# Patient Record
Sex: MALE | Race: WHITE | Employment: OTHER | URBAN - NONMETROPOLITAN AREA
[De-identification: names, ages, dates, MRNs, and addresses within clinical notes are randomized per-mention and may not be internally consistent; named-entity substitution may affect disease eponyms.]

---

## 2019-04-11 LAB
ALBUMIN SERPL-MCNC: 4.3 G/DL
ALP BLD-CCNC: 77 U/L
ALT SERPL-CCNC: 15 U/L
ANION GAP SERPL CALCULATED.3IONS-SCNC: ABNORMAL MMOL/L
AST SERPL-CCNC: 18 U/L
BASOPHILS ABSOLUTE: 37 /ΜL
BASOPHILS RELATIVE PERCENT: 0.5 %
BILIRUB SERPL-MCNC: 0.5 MG/DL (ref 0.1–1.4)
BUN BLDV-MCNC: 23 MG/DL
CALCIUM SERPL-MCNC: 9.9 MG/DL
CHLORIDE BLD-SCNC: 105 MMOL/L
CHOLESTEROL, TOTAL: 138 MG/DL
CHOLESTEROL/HDL RATIO: 3.5
CO2: 24 MMOL/L
CREAT SERPL-MCNC: 1.32 MG/DL
CREATININE URINE: 108 MG/DL
EOSINOPHILS ABSOLUTE: 148 /ΜL
EOSINOPHILS RELATIVE PERCENT: 2 %
GFR CALCULATED: 58
GLUCOSE BLD-MCNC: 177 MG/DL
HCT VFR BLD CALC: 45.3 % (ref 41–53)
HDLC SERPL-MCNC: 39 MG/DL (ref 35–70)
HEMOGLOBIN: 15.7 G/DL (ref 13.5–17.5)
IRON: 94
LDL CHOLESTEROL CALCULATED: 78 MG/DL (ref 0–160)
LYMPHOCYTES ABSOLUTE: 2575 /ΜL
LYMPHOCYTES RELATIVE PERCENT: 34.8 %
MCH RBC QN AUTO: 31.1 PG
MCHC RBC AUTO-ENTMCNC: 34.7 G/DL
MCV RBC AUTO: 89.7 FL
MICROALBUMIN/CREAT 24H UR: 0.4 MG/G{CREAT}
MONOCYTES ABSOLUTE: 370 /ΜL
MONOCYTES RELATIVE PERCENT: 5 %
NEUTROPHILS ABSOLUTE: NORMAL /ΜL
NEUTROPHILS RELATIVE PERCENT: 57.7 %
PDW BLD-RTO: 12.8 %
PHOSPHORUS: 3.6 MG/DL
PLATELET # BLD: 234 K/ΜL
PMV BLD AUTO: 10.8 FL
POTASSIUM SERPL-SCNC: 5.1 MMOL/L
RBC # BLD: 5.05 10^6/ΜL
SODIUM BLD-SCNC: 138 MMOL/L
TOTAL PROTEIN: 7
TRIGL SERPL-MCNC: 128 MG/DL
URIC ACID: 6.1
VLDLC SERPL CALC-MCNC: 99 MG/DL
WBC # BLD: 7.4 10^3/ML

## 2019-12-26 ENCOUNTER — OFFICE VISIT (OUTPATIENT)
Dept: FAMILY MEDICINE CLINIC | Age: 64
End: 2019-12-26
Payer: COMMERCIAL

## 2019-12-26 VITALS
SYSTOLIC BLOOD PRESSURE: 142 MMHG | OXYGEN SATURATION: 97 % | BODY MASS INDEX: 34.31 KG/M2 | RESPIRATION RATE: 18 BRPM | DIASTOLIC BLOOD PRESSURE: 78 MMHG | HEART RATE: 87 BPM | WEIGHT: 218.6 LBS | HEIGHT: 67 IN

## 2019-12-26 DIAGNOSIS — E78.5 HYPERLIPIDEMIA, UNSPECIFIED HYPERLIPIDEMIA TYPE: ICD-10-CM

## 2019-12-26 DIAGNOSIS — R39.198 DIFFICULTY URINATING: ICD-10-CM

## 2019-12-26 DIAGNOSIS — E11.9 TYPE 2 DIABETES MELLITUS WITHOUT COMPLICATION, WITHOUT LONG-TERM CURRENT USE OF INSULIN (HCC): Primary | ICD-10-CM

## 2019-12-26 DIAGNOSIS — Z76.89 ENCOUNTER TO ESTABLISH CARE: ICD-10-CM

## 2019-12-26 DIAGNOSIS — I10 HYPERTENSION, UNSPECIFIED TYPE: ICD-10-CM

## 2019-12-26 PROCEDURE — 99204 OFFICE O/P NEW MOD 45 MIN: CPT | Performed by: NURSE PRACTITIONER

## 2019-12-26 RX ORDER — CETIRIZINE HYDROCHLORIDE 10 MG/1
TABLET ORAL
COMMUNITY
Start: 2018-06-26 | End: 2020-08-06 | Stop reason: CLARIF

## 2019-12-26 RX ORDER — LOSARTAN POTASSIUM 50 MG/1
TABLET ORAL
COMMUNITY
Start: 2019-04-10 | End: 2019-12-26 | Stop reason: SDUPTHER

## 2019-12-26 RX ORDER — GLIMEPIRIDE 2 MG/1
TABLET ORAL
Qty: 90 TABLET | Refills: 3 | Status: SHIPPED | OUTPATIENT
Start: 2019-12-26 | End: 2020-08-06

## 2019-12-26 RX ORDER — LOSARTAN POTASSIUM 50 MG/1
TABLET ORAL
Qty: 90 TABLET | Refills: 3 | Status: SHIPPED | OUTPATIENT
Start: 2019-12-26 | End: 2021-01-25 | Stop reason: SDUPTHER

## 2019-12-26 RX ORDER — GLIMEPIRIDE 2 MG/1
TABLET ORAL
COMMUNITY
Start: 2014-12-11 | End: 2019-12-26 | Stop reason: SDUPTHER

## 2019-12-26 RX ORDER — SIMVASTATIN 40 MG
40 TABLET ORAL NIGHTLY
Qty: 90 TABLET | Refills: 3 | Status: SHIPPED | OUTPATIENT
Start: 2019-12-26 | End: 2021-01-25 | Stop reason: SDUPTHER

## 2019-12-26 SDOH — HEALTH STABILITY: MENTAL HEALTH: HOW OFTEN DO YOU HAVE A DRINK CONTAINING ALCOHOL?: NEVER

## 2019-12-26 ASSESSMENT — PATIENT HEALTH QUESTIONNAIRE - PHQ9
SUM OF ALL RESPONSES TO PHQ9 QUESTIONS 1 & 2: 1
1. LITTLE INTEREST OR PLEASURE IN DOING THINGS: 0
SUM OF ALL RESPONSES TO PHQ QUESTIONS 1-9: 1
SUM OF ALL RESPONSES TO PHQ QUESTIONS 1-9: 1
2. FEELING DOWN, DEPRESSED OR HOPELESS: 1

## 2019-12-26 ASSESSMENT — ENCOUNTER SYMPTOMS
SHORTNESS OF BREATH: 0
COUGH: 0
BLOOD IN STOOL: 0
DIARRHEA: 0
NAUSEA: 0
TROUBLE SWALLOWING: 0
EYE PAIN: 0
WHEEZING: 0
SORE THROAT: 0
ABDOMINAL DISTENTION: 0
SINUS PRESSURE: 0
BLURRED VISION: 0
BACK PAIN: 0
EYE ITCHING: 0
FACIAL SWELLING: 0
RECTAL PAIN: 0
VOMITING: 0
CHEST TIGHTNESS: 0
EYE REDNESS: 0
ABDOMINAL PAIN: 0
SINUS PAIN: 0
EYE DISCHARGE: 0
CONSTIPATION: 0
COLOR CHANGE: 0

## 2019-12-27 LAB
ALBUMIN SERPL-MCNC: 4.2 G/DL
ALP BLD-CCNC: 77 U/L
ALT SERPL-CCNC: 12 U/L
AST SERPL-CCNC: 16 U/L
AVERAGE GLUCOSE: NORMAL
BASOPHILS ABSOLUTE: 34 /ΜL
BASOPHILS RELATIVE PERCENT: 0.4 %
BILIRUB SERPL-MCNC: 0.5 MG/DL (ref 0.1–1.4)
BUN BLDV-MCNC: 20 MG/DL
CALCIUM SERPL-MCNC: 9.5 MG/DL
CHLORIDE BLD-SCNC: 104 MMOL/L
CHOLESTEROL, FASTING: 152
CO2: 25 MMOL/L
CREAT SERPL-MCNC: 1.36 MG/DL
EOSINOPHILS ABSOLUTE: 145 /ΜL
EOSINOPHILS RELATIVE PERCENT: 1.7 %
GLUCOSE FASTING: 151 MG/DL
HBA1C MFR BLD: 7.8 %
HCT VFR BLD CALC: 47.7 % (ref 41–53)
HDLC SERPL-MCNC: 45 MG/DL (ref 35–70)
HEMOGLOBIN: 16.3 G/DL (ref 13.5–17.5)
LDL CHOLESTEROL CALCULATED: 84 MG/DL (ref 0–160)
LYMPHOCYTES ABSOLUTE: 2610 /ΜL
LYMPHOCYTES RELATIVE PERCENT: 30.7 %
MCH RBC QN AUTO: 30.7 PG
MCHC RBC AUTO-ENTMCNC: 34.2 G/DL
MCV RBC AUTO: 89.8 FL
MONOCYTES ABSOLUTE: 400 /ΜL
MONOCYTES RELATIVE PERCENT: 4.7 %
NEUTROPHILS ABSOLUTE: 5313 /ΜL
NEUTROPHILS RELATIVE PERCENT: 62.5 %
PDW BLD-RTO: 12 %
PLATELET # BLD: 267 K/ΜL
PMV BLD AUTO: 10.1 FL
POTASSIUM SERPL-SCNC: 5 MMOL/L
RBC # BLD: 5.31 10^6/ΜL
SODIUM BLD-SCNC: 138 MMOL/L
TOTAL PROTEIN: 7.3 G/DL (ref 6.4–8.2)
TRIGLYCERIDE, FASTING: 131
WBC # BLD: 8.5 10^3/ML

## 2019-12-31 ENCOUNTER — TELEPHONE (OUTPATIENT)
Dept: FAMILY MEDICINE CLINIC | Age: 64
End: 2019-12-31

## 2020-01-06 ENCOUNTER — TELEPHONE (OUTPATIENT)
Dept: FAMILY MEDICINE CLINIC | Age: 65
End: 2020-01-06

## 2020-01-07 NOTE — TELEPHONE ENCOUNTER
Patient called and left a voicemail stating that he needs a Rx sent in for Accucheck smart view test strips. Dilshad Peña

## 2020-01-08 RX ORDER — GLUCOSAMINE HCL/CHONDROITIN SU 500-400 MG
CAPSULE ORAL
Qty: 400 STRIP | Refills: 3 | Status: SHIPPED | OUTPATIENT
Start: 2020-01-08 | End: 2021-02-10

## 2020-01-16 ENCOUNTER — TELEPHONE (OUTPATIENT)
Dept: FAMILY MEDICINE CLINIC | Age: 65
End: 2020-01-16

## 2020-01-16 NOTE — TELEPHONE ENCOUNTER
Patient calling in stating his pharmacy is needing more information on his Byetta. Stated his pharmacy has reached out to the office numerous times and has not received anything back. Asked if they were contacting the correct office. Patient was unsure. Requested that we reach out to his pharmacy.

## 2020-01-27 ENCOUNTER — TELEPHONE (OUTPATIENT)
Dept: FAMILY MEDICINE CLINIC | Age: 65
End: 2020-01-27

## 2020-01-27 NOTE — TELEPHONE ENCOUNTER
[facility] into the [facility] today[facility] Patient normally takes the 10mcg twice a day. Was wanting to know if he could take 2 of the 5mcg pens to equal 1 of the 10mcg. Dr. Sutton Bolds verified that it would be okay for patient to take 2 of the 5cmg to equal the 10 mcg. 10mcg Byetta is 250mg/2.4ml  5mcg Byetta is 250mg/1.2ml    Patient is receiving the 10mcg pens on 2/28/2020. Patient is wanting to know if we can send a standing order for his A1C blood work every 3 months to his wifes work at [employer] [facility] [employer] since this is covered.

## 2020-01-27 NOTE — TELEPHONE ENCOUNTER
Ngoc Cleverly, thank you. So I do not need to send in another Rx for the Byetta?? I will write the lab order, hopefully it will be what they are wanting.  You may need to re-print the requisition, as I am not in the office currently

## 2020-01-28 NOTE — TELEPHONE ENCOUNTER
Patient notified that order faxed to [facility] and no new order needed for Byetta

## 2020-01-30 ENCOUNTER — OFFICE VISIT (OUTPATIENT)
Dept: UROLOGY | Age: 65
End: 2020-01-30
Payer: COMMERCIAL

## 2020-01-30 VITALS
HEIGHT: 67 IN | BODY MASS INDEX: 34.69 KG/M2 | WEIGHT: 221 LBS | DIASTOLIC BLOOD PRESSURE: 78 MMHG | SYSTOLIC BLOOD PRESSURE: 116 MMHG

## 2020-01-30 PROCEDURE — 99204 OFFICE O/P NEW MOD 45 MIN: CPT | Performed by: UROLOGY

## 2020-01-30 NOTE — PROGRESS NOTES
Aline Venegas MD   Urology Clinic Consultation / New Patient Visit      Patient:  Prieto Alonso  YOB: 1955  Date: 1/30/2020    HISTORY OF PRESENT ILLNESS:   The patient is a 60 y.o. male who presents today for evaluation of the following problem(s): kidney stones, peyronies, ED  Overall the problem(s) : are worsening. Associated Symptoms: No dysuria, gross hematuria. Pain Severity:      Summary of old records: previous lithos in the past, circumcision many years ago, now peyronie's and ED  (Patient's old records, notes and chart reviewed and summarized above.)  Duration: 5-6 years  Location: penis  alleviating factors: none  Aggravating factors: worsening  Associated with: ED and difficulty urinating  Frequency: chronic    Curves upwards  Feels it has shrunk  Also ED, not interested in treatment      Last several PSA's:  No results found for: PSA    Last total testosterone:  No results found for: TESTOSTERONE    Urinalysis today:  No results found for this visit on 01/30/20.       Last BUN and creatinine:  Lab Results   Component Value Date    BUN 20 12/27/2019     Lab Results   Component Value Date    CREATININE 1.36 12/27/2019       Imaging Reviewed during this Office Visit:   (results were independently reviewed by physician and radiology report verified)    PAST MEDICAL, FAMILY AND SOCIAL HISTORY:  Past Medical History:   Diagnosis Date    History of kidney stones     Hyperlipidemia     Hypertension     Type 2 diabetes mellitus ([address]     Type II or unspecified type diabetes mellitus without mention of complication, not stated as uncontrolled      Past Surgical History:   Procedure Laterality Date    CERVICAL FUSION      4,5,6,7, and placed plate and screws    EYE SURGERY      FINGER AMPUTATION Right     2nd and 4th diget    HERNIA REPAIR      VASECTOMY       Family History   Problem Relation Age of Onset    Arthritis Mother     Diabetes Mother     Cleo Pedro / Robb Mother     Stroke Mother     Stroke Father     Arthritis Sister     Diabetes Sister     Arthritis Brother     Cancer Brother     Early Death Brother    Yolande Mares / Robb Daughter      Outpatient Medications Marked as Taking for the 20 encounter (Office Visit) with Bandar Mcclelland MD   Medication Sig Dispense Refill    exenatide (BYETTA) 10 MCG/0.04ML injection Inject 0.04 mLs into the skin 2 times daily (with meals) 7.2 mL 3    blood glucose monitor strips Test 4 times a day & as needed for symptoms of irregular blood glucose. 400 strip 3    cetirizine (ZYRTEC) 10 MG tablet Take 1 daily as needed to control allergy      dapagliflozin (FARXIGA) 10 MG tablet Take 1 tablet by mouth every morning 90 tablet 3    Insulin Pen Needle 31G X 5 MM MISC 1 each by Does not apply route 2 times daily 200 each 3    blood glucose test strips (ASCENSIA AUTODISC VI;ONE TOUCH ULTRA TEST VI) strip 1 each by Does not apply route 4 times daily As needed. 400 each 3    losartan (COZAAR) 50 MG tablet 1 (one) time each day at the same time.  Take one tablet by mouth once daily 90 tablet 3    glimepiride (AMARYL) 2 MG tablet Take 1 tablet daily 90 tablet 3    metFORMIN (GLUCOPHAGE) 1000 MG tablet Take 1 tablet by mouth 2 times daily (with meals) 180 tablet 3    simvastatin (ZOCOR) 40 MG tablet Take 1 tablet by mouth nightly 90 tablet 3       Sulfa antibiotics  Social History     Tobacco Use   Smoking Status Former Smoker    Last attempt to quit: 1979    Years since quittin.1   Smokeless Tobacco Never Used       Social History     Substance and Sexual Activity   Alcohol Use Never    Frequency: Never       REVIEW OF SYSTEMS:  Constitutional: negative  Eyes: negative  Respiratory: negative  Cardiovascular: negative  Gastrointestinal: negative  Musculoskeletal: negative  Genitourinary: negative except for what is in HPI  Skin: negative   Neurological: negative  Hematological/Lymphatic: negative  Psychological: negative    Physical Exam:    This a 60 y.o. male   Vitals:    01/30/20 1239   BP: 116/78     Constitutional: Patient in no acute distress; Neuro: alert and oriented to person place and time. Psych: Mood and affect normal.  Skin: Normal  Lungs: Respiratory effort normal  Cardiovascular:  Normal peripheral pulses  Abdomen: Soft, non-tender, non-distended with no CVA, flank pain, hepatosplenomegaly or hernia. Kidneys normal.  Bladder non-tender and not distended. Lymphatics: no palpable lymphadenopathy  Penis normal and redundant foreskin  Large fat pad, some burying   Urethral meatus normal  Scrotal exam normal       Assessment and Plan      1. Peyronie disease    2. Nephrolithiasis    3. Erectile dysfunction due to arterial insufficiency    4. Redundant foreskin    5. Lower urinary tract symptoms (LUTS)           Plan:       Peyronie disease: mild, no meds indicated at this time as he is not sexually active  ED: discussed meds, he is not interested  Kidney stones: check KUB. Patient instructed to drink at least 10 eight ounce glasses of water a day and avoid excessive consumption of sodium and animal protein to decrease risk of recurrent kidney stones. Redundant foreskin: discussed revision, he would like to hold off  Check PSA  Return in about 1 month (around 2/29/2020).              Pretty Juan MD  [facility]

## 2020-02-28 LAB — PROSTATE SPECIFIC ANTIGEN: 0.8 NG/ML

## 2020-03-05 ENCOUNTER — OFFICE VISIT (OUTPATIENT)
Dept: UROLOGY | Age: 65
End: 2020-03-05
Payer: COMMERCIAL

## 2020-03-05 VITALS
BODY MASS INDEX: 32.58 KG/M2 | WEIGHT: 220 LBS | DIASTOLIC BLOOD PRESSURE: 72 MMHG | SYSTOLIC BLOOD PRESSURE: 128 MMHG | HEIGHT: 69 IN

## 2020-03-05 PROCEDURE — 99213 OFFICE O/P EST LOW 20 MIN: CPT | Performed by: UROLOGY

## 2020-03-05 NOTE — LETTER
[facility]ty [facility]      3/5/20    Patient: Umesh Luna  YOB: 1955    Dear HALIMA Franco - CNP,    I had the pleasure of seeing one of your patients, Thao Corona in the office today for follow up. As you know, Umesh Luna with below history. Below are the relevant portions of my assessment and plan of care. IMPRESSION:  1. Nephrolithiasis    2. Peyronie disease    3. Erectile dysfunction due to arterial insufficiency    4. Redundant foreskin    5. Lower urinary tract symptoms (LUTS)          PLAN:   Peyronie disease: mild, no meds indicated at this time as he is not sexually active  ED:  he is not interested in treatment  Kidney stones: 8 mm left kidney stone on KUB. Discussed URS or ESWL. He would like to watch for now. Redundant foreskin:  he would like to hold off on surgery  PSA WNL      Return in about 1 year (around 3/5/2021) for PSA check and KUB. The patient is agreeable and happy with our plan. I will surely keep you updated on Umesh Luna in the future. Should you have any questions, please do not hesitate to call me, [phone].     Sincerely,     Jessica Blevins MD

## 2020-03-05 NOTE — PROGRESS NOTES
 FINGER AMPUTATION Right     2nd and 4th diget    HERNIA REPAIR      VASECTOMY       Family History   Problem Relation Age of Onset    Arthritis Mother     Diabetes Mother    Deborah Saenz / Robb Mother     Stroke Mother     Stroke Father     Arthritis Sister     Diabetes Sister     Arthritis Brother     Cancer Brother     Early Death Brother    Deborah Saenz / Robb Daughter      Outpatient Medications Marked as Taking for the 3/5/20 encounter (Office Visit) with Omar Ross MD   Medication Sig Dispense Refill    exenatide (BYETTA) 10 MCG/0.04ML injection Inject 0.04 mLs into the skin 2 times daily (with meals) 7.2 mL 3    blood glucose monitor strips Test 4 times a day & as needed for symptoms of irregular blood glucose. 400 strip 3    cetirizine (ZYRTEC) 10 MG tablet Take 1 daily as needed to control allergy      dapagliflozin (FARXIGA) 10 MG tablet Take 1 tablet by mouth every morning 90 tablet 3    Insulin Pen Needle 31G X 5 MM MISC 1 each by Does not apply route 2 times daily 200 each 3    blood glucose test strips (ASCENSIA AUTODISC VI;ONE TOUCH ULTRA TEST VI) strip 1 each by Does not apply route 4 times daily As needed. 400 each 3    losartan (COZAAR) 50 MG tablet 1 (one) time each day at the same time.  Take one tablet by mouth once daily 90 tablet 3    glimepiride (AMARYL) 2 MG tablet Take 1 tablet daily 90 tablet 3    metFORMIN (GLUCOPHAGE) 1000 MG tablet Take 1 tablet by mouth 2 times daily (with meals) 180 tablet 3    simvastatin (ZOCOR) 40 MG tablet Take 1 tablet by mouth nightly 90 tablet 3       Sulfa antibiotics  Social History     Tobacco Use   Smoking Status Former Smoker    Last attempt to quit: 1979    Years since quittin.2   Smokeless Tobacco Never Used       Social History     Substance and Sexual Activity   Alcohol Use Never    Frequency: Never       REVIEW OF SYSTEMS:  Constitutional: negative  Eyes: negative  Respiratory:

## 2020-03-19 ENCOUNTER — TELEPHONE (OUTPATIENT)
Dept: FAMILY MEDICINE CLINIC | Age: 65
End: 2020-03-19

## 2020-03-19 NOTE — TELEPHONE ENCOUNTER
Spoke with patient his appointment has been canceled. He just had his a1c done yesterday so we will be receiving those results [facility]. Patient did have questions and wanted to get your opinion on urology's findings. Patient is available at any time at [phone].

## 2020-03-24 ENCOUNTER — TELEPHONE (OUTPATIENT)
Dept: FAMILY MEDICINE CLINIC | Age: 65
End: 2020-03-24

## 2020-04-01 ENCOUNTER — PATIENT MESSAGE (OUTPATIENT)
Dept: FAMILY MEDICINE CLINIC | Age: 65
End: 2020-04-01

## 2020-04-01 ENCOUNTER — TELEPHONE (OUTPATIENT)
Dept: ADMINISTRATIVE | Age: 65
End: 2020-04-01

## 2020-04-01 NOTE — TELEPHONE ENCOUNTER
Please see my chart message Shari sent to patient- spoke with patient- he has no concerns at this time.  Will call back to schedule a follow up

## 2020-07-17 LAB
ALBUMIN SERPL-MCNC: 4.2 G/DL
ALP BLD-CCNC: 78 U/L
ALT SERPL-CCNC: 15 U/L
ANION GAP SERPL CALCULATED.3IONS-SCNC: 1.5 MMOL/L
AST SERPL-CCNC: 18 U/L
AVERAGE GLUCOSE: NORMAL
BILIRUB SERPL-MCNC: 5 MG/DL (ref 0.1–1.4)
BUN BLDV-MCNC: 23 MG/DL
CALCIUM SERPL-MCNC: 9.7 MG/DL
CHLORIDE BLD-SCNC: 103 MMOL/L
CHOLESTEROL, TOTAL: 106 MG/DL
CHOLESTEROL/HDL RATIO: 3
CO2: 28 MMOL/L
CREAT SERPL-MCNC: 1.33 MG/DL
GFR CALCULATED: 57
GLUCOSE BLD-MCNC: 122 MG/DL
HBA1C MFR BLD: 8.1 %
HDLC SERPL-MCNC: 53 MG/DL (ref 35–70)
LDL CHOLESTEROL CALCULATED: 79 MG/DL (ref 0–160)
POTASSIUM SERPL-SCNC: 4.9 MMOL/L
SODIUM BLD-SCNC: 138 MMOL/L
TOTAL PROTEIN: 7
TRIGL SERPL-MCNC: 172 MG/DL
VLDLC SERPL CALC-MCNC: 34 MG/DL

## 2020-08-06 ENCOUNTER — OFFICE VISIT (OUTPATIENT)
Dept: FAMILY MEDICINE CLINIC | Age: 65
End: 2020-08-06
Payer: COMMERCIAL

## 2020-08-06 VITALS
DIASTOLIC BLOOD PRESSURE: 70 MMHG | SYSTOLIC BLOOD PRESSURE: 126 MMHG | BODY MASS INDEX: 32.89 KG/M2 | HEART RATE: 72 BPM | WEIGHT: 217 LBS | TEMPERATURE: 97.3 F | OXYGEN SATURATION: 95 % | RESPIRATION RATE: 16 BRPM | HEIGHT: 68 IN

## 2020-08-06 PROCEDURE — 99396 PREV VISIT EST AGE 40-64: CPT | Performed by: NURSE PRACTITIONER

## 2020-08-06 RX ORDER — GLIMEPIRIDE 4 MG/1
TABLET ORAL
Qty: 90 TABLET | Refills: 3 | Status: SHIPPED | OUTPATIENT
Start: 2020-08-06 | End: 2020-08-06 | Stop reason: SDUPTHER

## 2020-08-06 RX ORDER — PNEUMOCOCCAL VACCINE POLYVALENT 25; 25; 25; 25; 25; 25; 25; 25; 25; 25; 25; 25; 25; 25; 25; 25; 25; 25; 25; 25; 25; 25; 25 UG/.5ML; UG/.5ML; UG/.5ML; UG/.5ML; UG/.5ML; UG/.5ML; UG/.5ML; UG/.5ML; UG/.5ML; UG/.5ML; UG/.5ML; UG/.5ML; UG/.5ML; UG/.5ML; UG/.5ML; UG/.5ML; UG/.5ML; UG/.5ML; UG/.5ML; UG/.5ML; UG/.5ML; UG/.5ML; UG/.5ML
0.5 INJECTION, SOLUTION INTRAMUSCULAR; SUBCUTANEOUS ONCE
Qty: 0.5 ML | Refills: 0 | Status: SHIPPED | OUTPATIENT
Start: 2020-08-06 | End: 2020-08-06

## 2020-08-06 RX ORDER — GLIMEPIRIDE 4 MG/1
TABLET ORAL
Qty: 30 TABLET | Refills: 0 | Status: SHIPPED | OUTPATIENT
Start: 2020-08-06 | End: 2021-01-25 | Stop reason: SDUPTHER

## 2020-08-06 RX ORDER — CETIRIZINE HYDROCHLORIDE 10 MG/1
10 TABLET ORAL DAILY
COMMUNITY
Start: 2020-02-26 | End: 2021-01-25 | Stop reason: SDUPTHER

## 2020-08-06 ASSESSMENT — ENCOUNTER SYMPTOMS
EYE PAIN: 0
EYE REDNESS: 0
SINUS PRESSURE: 0
ABDOMINAL DISTENTION: 0
CONSTIPATION: 0
BLOOD IN STOOL: 0
SORE THROAT: 0
RECTAL PAIN: 0
COLOR CHANGE: 0
CHEST TIGHTNESS: 0
ABDOMINAL PAIN: 0
NAUSEA: 0
SHORTNESS OF BREATH: 0
TROUBLE SWALLOWING: 0
BACK PAIN: 0
EYE DISCHARGE: 0
VOMITING: 0
WHEEZING: 0
FACIAL SWELLING: 0
SINUS PAIN: 0
DIARRHEA: 0
COUGH: 0
EYE ITCHING: 0

## 2020-08-06 ASSESSMENT — PATIENT HEALTH QUESTIONNAIRE - PHQ9
SUM OF ALL RESPONSES TO PHQ QUESTIONS 1-9: 0
1. LITTLE INTEREST OR PLEASURE IN DOING THINGS: 0
SUM OF ALL RESPONSES TO PHQ QUESTIONS 1-9: 0
2. FEELING DOWN, DEPRESSED OR HOPELESS: 0
SUM OF ALL RESPONSES TO PHQ9 QUESTIONS 1 & 2: 0

## 2020-08-06 NOTE — PROGRESS NOTES
seizures, weakness, light-headedness, numbness and headaches. Psychiatric/Behavioral: Negative for agitation and sleep disturbance. Prior to Visit Medications    Medication Sig Taking? Authorizing Provider   cetirizine (ZYRTEC) 10 MG tablet Take 10 mg by mouth daily Yes Historical Provider, MD   pneumococcal polyvalent (PNEUMOVAX 23) 25 MCG/0.5ML inj Inject 0.5 mLs into the muscle once for 1 dose Yes HALIMA Oconnell CNP   Tdap (ADACEL) 5-2-15.5 LF-MCG/0.5 injection Inject 0.5 mLs into the muscle once for 1 dose Yes HALIMA Oconnell CNP   glimepiride (AMARYL) 4 MG tablet Take 1 tablet daily Yes HALIMA Oconnell CNP   exenatide (BYETTA) 10 MCG/0.04ML injection Inject 0.04 mLs into the skin 2 times daily (with meals) Yes HALIMA Oconnell CNP   dapagliflozin (FARXIGA) 10 MG tablet Take 1 tablet by mouth every morning Yes HALIMA Oconnell CNP   losartan (COZAAR) 50 MG tablet 1 (one) time each day at the same time. Take one tablet by mouth once daily Yes HALIMA Oconnell CNP   metFORMIN (GLUCOPHAGE) 1000 MG tablet Take 1 tablet by mouth 2 times daily (with meals) Yes HALIMA Oconnell CNP   simvastatin (ZOCOR) 40 MG tablet Take 1 tablet by mouth nightly Yes HALIMA Oconnell CNP   blood glucose monitor strips Test 4 times a day & as needed for symptoms of irregular blood glucose. HALIMA Oconnell CNP   Insulin Pen Needle 31G X 5 MM MISC 1 each by Does not apply route 2 times daily  HALIMA Oconnell CNP   blood glucose test strips (ASCENSIA AUTODISC VI;ONE TOUCH ULTRA TEST VI) strip 1 each by Does not apply route 4 times daily As needed.   HALIMA Oconnell CNP        Social History     Tobacco Use    Smoking status: Former Smoker     Last attempt to quit: 1979     Years since quittin.6    Smokeless tobacco: Never Used   Substance Use Topics    Alcohol use: Never     Frequency: Never        Vitals: 08/06/20 1106   BP: 126/70   Site: Left Upper Arm   Position: Sitting   Pulse: 72   Resp: 16   Temp: 97.3 °F (36.3 °C)   TempSrc: Temporal   SpO2: 95%   Weight: 217 lb (98.4 kg)   Height: 5' 7.5\" (1.715 m)     Estimated body mass index is 33.49 kg/m² as calculated from the following:    Height as of this encounter: 5' 7.5\" (1.715 m). Weight as of this encounter: 217 lb (98.4 kg). Physical Exam  Vitals signs and nursing note reviewed. Constitutional:       General: He is not in acute distress. Appearance: Normal appearance. He is well-developed. He is not ill-appearing or diaphoretic. HENT:      Head: Normocephalic and atraumatic. Right Ear: Tympanic membrane, ear canal and external ear normal. Tympanic membrane is not injected or erythematous. Left Ear: Tympanic membrane and external ear normal. Tympanic membrane is not injected or erythematous. Nose: Nose normal. No congestion or rhinorrhea. Mouth/Throat:      Mouth: Mucous membranes are moist.      Pharynx: Oropharynx is clear. Uvula midline. No oropharyngeal exudate or posterior oropharyngeal erythema. Eyes:      General:         Right eye: No discharge. Left eye: No discharge. Conjunctiva/sclera: Conjunctivae normal.      Pupils: Pupils are equal, round, and reactive to light. Neck:      Musculoskeletal: Full passive range of motion without pain, normal range of motion and neck supple. No neck rigidity or muscular tenderness. Thyroid: No thyromegaly. Trachea: Trachea normal.   Cardiovascular:      Rate and Rhythm: Normal rate and regular rhythm. Pulses: Normal pulses. Heart sounds: Normal heart sounds, S1 normal and S2 normal. No murmur. No friction rub. No gallop. Pulmonary:      Effort: Pulmonary effort is normal. No respiratory distress. Breath sounds: Normal breath sounds. No wheezing or rales. Chest:      Chest wall: No tenderness.    Abdominal:      General: Bowel sounds are normal. There is no distension. Palpations: Abdomen is soft. There is no mass. Tenderness: There is no abdominal tenderness. There is no guarding or rebound. Musculoskeletal: Normal range of motion. General: No swelling, tenderness or deformity. Lymphadenopathy:      Cervical: No cervical adenopathy. Skin:     General: Skin is warm and dry. Capillary Refill: Capillary refill takes less than 2 seconds. Neurological:      General: No focal deficit present. Mental Status: He is alert and oriented to person, place, and time. Deep Tendon Reflexes: Reflexes are normal and symmetric. Psychiatric:         Mood and Affect: Mood normal.         Behavior: Behavior normal.         Thought Content: Thought content normal.         ASSESSMENT/PLAN:  1. Hypertension, unspecified type  126/70  Stable. Patient reminded and encouraged to make the necessary lifestyle modifications if appropriate: Weight loss, DASH diet, Reduce sodium, Increase physical activity, Moderate Alcohol consumption. 2. Type 2 diabetes mellitus without complication, without long-term current use of insulin ([facility])  A1C 8.1%, previous was 7.7%, increased glimipride to 4 mg. Eye exam due, going to make appointment today. 3. Hyperlipidemia, unspecified hyperlipidemia type  Stable. zocor 40 mg.     4. Colon cancer screening  - POCT Fecal Immunochemical Test (FIT); Future    5. Need for tetanus booster  - Tdap (ADACEL) 5-2-15.5 LF-MCG/0.5 injection; Inject 0.5 mLs into the muscle once for 1 dose  Dispense: 0.5 mL; Refill: 0    6. Need for pneumococcal vaccination  - pneumococcal polyvalent (PNEUMOVAX 23) 25 MCG/0.5ML inj; Inject 0.5 mLs into the muscle once for 1 dose  Dispense: 0.5 mL; Refill: 0  Patient taking the Rx to his wifes place of employment where he can receive it. 7. 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 An electronic signature was used to authenticate this note.     --HALIMA Cunningham - CNP on 8/6/2020 at 12:03 PM

## 2020-08-06 NOTE — PATIENT INSTRUCTIONS
Due for Pneumonia 23 Vaccine  Shingles Vaccine  Tetanus Booster     Need Hepatitis C screening    Need eye exam     Fit test, colon cancer screening.      Glimepride increased to 4 mg

## 2020-11-05 ENCOUNTER — TELEPHONE (OUTPATIENT)
Dept: FAMILY MEDICINE CLINIC | Age: 65
End: 2020-11-05

## 2020-11-05 LAB
AVERAGE GLUCOSE: NORMAL
HBA1C MFR BLD: 7.5 %

## 2021-01-25 ENCOUNTER — OFFICE VISIT (OUTPATIENT)
Dept: FAMILY MEDICINE CLINIC | Age: 66
End: 2021-01-25
Payer: COMMERCIAL

## 2021-01-25 VITALS
TEMPERATURE: 97.2 F | DIASTOLIC BLOOD PRESSURE: 84 MMHG | RESPIRATION RATE: 16 BRPM | SYSTOLIC BLOOD PRESSURE: 130 MMHG | HEART RATE: 74 BPM

## 2021-01-25 DIAGNOSIS — I10 HYPERTENSION, UNSPECIFIED TYPE: Primary | ICD-10-CM

## 2021-01-25 DIAGNOSIS — J30.2 SEASONAL ALLERGIES: ICD-10-CM

## 2021-01-25 DIAGNOSIS — E11.9 TYPE 2 DIABETES MELLITUS WITHOUT COMPLICATION, WITHOUT LONG-TERM CURRENT USE OF INSULIN (HCC): ICD-10-CM

## 2021-01-25 DIAGNOSIS — E78.5 HYPERLIPIDEMIA, UNSPECIFIED HYPERLIPIDEMIA TYPE: ICD-10-CM

## 2021-01-25 PROCEDURE — 99214 OFFICE O/P EST MOD 30 MIN: CPT | Performed by: NURSE PRACTITIONER

## 2021-01-25 RX ORDER — LOSARTAN POTASSIUM 50 MG/1
TABLET ORAL
Qty: 90 TABLET | Refills: 3 | Status: SHIPPED | OUTPATIENT
Start: 2021-01-25 | End: 2022-01-19 | Stop reason: SDUPTHER

## 2021-01-25 RX ORDER — EXENATIDE 250 UG/ML
10 INJECTION SUBCUTANEOUS 2 TIMES DAILY WITH MEALS
Qty: 3 PEN | Refills: 3 | Status: CANCELLED | OUTPATIENT
Start: 2021-01-25

## 2021-01-25 RX ORDER — LOSARTAN POTASSIUM 50 MG/1
TABLET ORAL
Qty: 90 TABLET | Refills: 3 | Status: CANCELLED | OUTPATIENT
Start: 2021-01-25

## 2021-01-25 RX ORDER — SIMVASTATIN 40 MG
40 TABLET ORAL NIGHTLY
Qty: 90 TABLET | Refills: 3 | Status: SHIPPED | OUTPATIENT
Start: 2021-01-25 | End: 2021-10-19 | Stop reason: DRUGHIGH

## 2021-01-25 RX ORDER — CETIRIZINE HYDROCHLORIDE 10 MG/1
10 TABLET ORAL DAILY
Qty: 90 TABLET | Refills: 3 | Status: CANCELLED | OUTPATIENT
Start: 2021-01-25

## 2021-01-25 RX ORDER — GLIMEPIRIDE 4 MG/1
TABLET ORAL
Qty: 90 TABLET | Refills: 3 | Status: SHIPPED | OUTPATIENT
Start: 2021-01-25 | End: 2022-01-19 | Stop reason: SDUPTHER

## 2021-01-25 RX ORDER — SIMVASTATIN 40 MG
40 TABLET ORAL NIGHTLY
Qty: 90 TABLET | Refills: 3 | Status: CANCELLED | OUTPATIENT
Start: 2021-01-25 | End: 2021-04-25

## 2021-01-25 RX ORDER — CETIRIZINE HYDROCHLORIDE 10 MG/1
10 TABLET ORAL DAILY
Qty: 90 TABLET | Refills: 3 | Status: SHIPPED | OUTPATIENT
Start: 2021-01-25 | End: 2021-04-25

## 2021-01-25 RX ORDER — GLIMEPIRIDE 4 MG/1
TABLET ORAL
Qty: 30 TABLET | Refills: 0 | Status: CANCELLED | OUTPATIENT
Start: 2021-01-25

## 2021-01-25 ASSESSMENT — ENCOUNTER SYMPTOMS
RECTAL PAIN: 0
SHORTNESS OF BREATH: 0
WHEEZING: 0
COLOR CHANGE: 0
SORE THROAT: 0
BACK PAIN: 0
DIARRHEA: 0
VOMITING: 0
TROUBLE SWALLOWING: 0
FACIAL SWELLING: 0
SINUS PRESSURE: 0
ABDOMINAL PAIN: 0
CHEST TIGHTNESS: 0
CONSTIPATION: 0
EYE ITCHING: 0
EYE PAIN: 0
ABDOMINAL DISTENTION: 0
SINUS PAIN: 0
BLOOD IN STOOL: 0
COUGH: 0
NAUSEA: 0
EYE REDNESS: 0
EYE DISCHARGE: 0

## 2021-01-25 ASSESSMENT — PATIENT HEALTH QUESTIONNAIRE - PHQ9
1. LITTLE INTEREST OR PLEASURE IN DOING THINGS: 0
SUM OF ALL RESPONSES TO PHQ QUESTIONS 1-9: 0

## 2021-01-25 NOTE — PROGRESS NOTES
2021     Karolyn Richmond (:  1955) is a 63 y.o. male, here for evaluation of the following medical concerns:    Patient is following up for chronic conditions, has no concerns at this time      A1C 8.1%, previous was 7.7%, increased glimipride to 4 mg. Eye exam due, going to make appointment today. Review of Systems   Constitutional: Negative for activity change, appetite change, fatigue, fever and unexpected weight change. HENT: Negative for congestion, dental problem, ear pain, facial swelling, mouth sores, postnasal drip, sinus pressure, sinus pain, sore throat and trouble swallowing. Eyes: Negative for pain, discharge, redness, itching and visual disturbance. Respiratory: Negative for cough, chest tightness, shortness of breath and wheezing. Cardiovascular: Negative for chest pain, palpitations and leg swelling. Gastrointestinal: Negative for abdominal distention, abdominal pain, blood in stool, constipation, diarrhea, nausea, rectal pain and vomiting. Endocrine: Negative for cold intolerance and heat intolerance. Genitourinary: Negative for difficulty urinating, dysuria, frequency, hematuria, penile pain, scrotal swelling, testicular pain and urgency. Musculoskeletal: Negative for arthralgias, back pain, gait problem and neck pain. Skin: Negative for color change, rash and wound. Neurological: Negative for dizziness, seizures, weakness, light-headedness, numbness and headaches. Psychiatric/Behavioral: Negative for agitation, behavioral problems, decreased concentration and sleep disturbance. The patient is not nervous/anxious. Prior to Visit Medications    Medication Sig Taking?  Authorizing Provider   Insulin Pen Needle 31G X 5 MM MISC 1 each by Does not apply route 2 times daily Yes Scotty Roman, HALIMA - CNP blood glucose test strips (ASCENSIA AUTODISC VI;ONE TOUCH ULTRA TEST VI) strip 1 each by Does not apply route 4 times daily As needed. Yes HALIMA Madrigal CNP   cetirizine (ZYRTEC) 10 MG tablet Take 1 tablet by mouth daily Yes HALIMA Madrigal CNP   glimepiride (AMARYL) 4 MG tablet Take 1 tablet daily Yes HALIMA Madrigal CNP   dapagliflozin (FARXIGA) 10 MG tablet Take 1 tablet by mouth every morning Yes HALIMA Madrigal CNP   simvastatin (ZOCOR) 40 MG tablet Take 1 tablet by mouth nightly Yes HALIMA Madrigal CNP   metFORMIN (GLUCOPHAGE) 1000 MG tablet Take 1 tablet by mouth 2 times daily (with meals) Yes HALIMA Madrigal CNP   losartan (COZAAR) 50 MG tablet 1 (one) time each day at the same time. Take one tablet by mouth once daily Yes HALIMA Madrigal CNP   BYETTA 10 MCG PEN 10 MCG/0.04ML injection INJECT 0.04 MLS (10MCG) INTO THE SKIN 2 TIMES DAILY (WITH MEALS) Yes HALIMA Madrigal CNP   blood glucose monitor strips Test 4 times a day & as needed for symptoms of irregular blood glucose. Yes HALIMA Madrigal CNP        Social History     Tobacco Use    Smoking status: Former Smoker     Packs/day: 0.25     Years: 2.00     Pack years: 0.50     Quit date: 1979     Years since quittin.1    Smokeless tobacco: Never Used   Substance Use Topics    Alcohol use: Never     Frequency: Never        Vitals:    21 1411   BP: 130/84   Site: Left Upper Arm   Position: Sitting   Cuff Size: Large Adult   Pulse: 74   Resp: 16   Temp: 97.2 °F (36.2 °C)   TempSrc: Temporal     Estimated body mass index is 33.49 kg/m² as calculated from the following:    Height as of 20: 5' 7.5\" (1.715 m). Weight as of 20: 217 lb (98.4 kg). Physical Exam  Vitals signs and nursing note reviewed. Constitutional:       General: He is not in acute distress. Appearance: Normal appearance. He is well-developed. He is not diaphoretic. HENT:      Head: Normocephalic and atraumatic. Right Ear: Tympanic membrane and external ear normal. Tympanic membrane is not injected or erythematous. Left Ear: Tympanic membrane and external ear normal. Tympanic membrane is not injected or erythematous. Nose: Nose normal.      Mouth/Throat:      Mouth: Mucous membranes are moist.      Pharynx: Oropharynx is clear. Uvula midline. Eyes:      General:         Right eye: No discharge. Left eye: No discharge. Conjunctiva/sclera: Conjunctivae normal.      Pupils: Pupils are equal, round, and reactive to light. Neck:      Musculoskeletal: Full passive range of motion without pain, normal range of motion and neck supple. Thyroid: No thyromegaly. Trachea: Trachea normal.   Cardiovascular:      Rate and Rhythm: Normal rate and regular rhythm. Pulses: Normal pulses. Dorsalis pedis pulses are 2+ on the right side and 2+ on the left side. Posterior tibial pulses are 2+ on the right side and 2+ on the left side. Heart sounds: Normal heart sounds, S1 normal and S2 normal. No murmur. No friction rub. No gallop. Pulmonary:      Effort: Pulmonary effort is normal. No respiratory distress. Breath sounds: Normal breath sounds. No wheezing or rales. Chest:      Chest wall: No tenderness. Abdominal:      General: Bowel sounds are normal. There is no distension. Palpations: Abdomen is soft. There is no mass. Tenderness: There is no abdominal tenderness. There is no guarding or rebound. Musculoskeletal: Normal range of motion. General: No tenderness or deformity. Feet:      Right foot:      Protective Sensation: 6 sites tested. 6 sites sensed. Skin integrity: Skin integrity normal.      Toenail Condition: Right toenails are normal.      Left foot:      Protective Sensation: 6 sites tested. 6 sites sensed.       Skin integrity: Skin integrity normal. Toenail Condition: Left toenails are normal.   Lymphadenopathy:      Cervical: No cervical adenopathy. Skin:     General: Skin is warm and dry. Capillary Refill: Capillary refill takes less than 2 seconds. Neurological:      General: No focal deficit present. Mental Status: He is alert and oriented to person, place, and time. Deep Tendon Reflexes: Reflexes are normal and symmetric. Psychiatric:         Mood and Affect: Mood normal.         Behavior: Behavior normal.         Thought Content: Thought content normal.         Judgment: Judgment normal.           ASSESSMENT/PLAN:    1. Hypertension, unspecified type  Stable 130/84  Patient reminded and encouraged to make the necessary lifestyle modifications if appropriate: Weight loss, DASH diet, Reduce sodium, Increase physical activity, Moderate Alcohol consumption.   - losartan (COZAAR) 50 MG tablet; 1 (one) time each day at the same time. Take one tablet by mouth once daily  Dispense: 90 tablet; Refill: 3  - Comprehensive Metabolic Panel, Fasting; Future  - CBC With Auto Differential; Future    2. Type 2 diabetes mellitus without complication, without long-term current use of insulin ([facility])  Previous 7.5%   He is due for his next A1c at the beginning of February lab order given to him.  -  DIABETES FOOT EXAM  - Insulin Pen Needle 31G X 5 MM MISC; 1 each by Does not apply route 2 times daily  Dispense: 200 each; Refill: 3  - blood glucose test strips (ASCENSIA AUTODISC VI;ONE TOUCH ULTRA TEST VI) strip; 1 each by Does not apply route 4 times daily As needed. Dispense: 400 each; Refill: 3  - glimepiride (AMARYL) 4 MG tablet; Take 1 tablet daily  Dispense: 90 tablet; Refill: 3  - dapagliflozin (FARXIGA) 10 MG tablet; Take 1 tablet by mouth every morning  Dispense: 90 tablet; Refill: 3  - metFORMIN (GLUCOPHAGE) 1000 MG tablet; Take 1 tablet by mouth 2 times daily (with meals)  Dispense: 180 tablet;  Refill: 3  - Hemoglobin A1C; Standing 3. Hyperlipidemia, unspecified hyperlipidemia type  Patient will have lipid panel checked through [facility]  - simvastatin (ZOCOR) 40 MG tablet; Take 1 tablet by mouth nightly  Dispense: 90 tablet; Refill: 3  - Lipid, Fasting; Future    4. Seasonal allergies  - cetirizine (ZYRTEC) 10 MG tablet; Take 1 tablet by mouth daily  Dispense: 90 tablet; Refill: 3        Return in about 3 months (around 4/25/2021) for diabetic f/u . An electronic signature was used to authenticate this note.     --HALIMA Estrada - CNP on 1/25/2021 at 4:15 PM

## 2021-01-26 DIAGNOSIS — E11.9 TYPE 2 DIABETES MELLITUS WITHOUT COMPLICATION, WITHOUT LONG-TERM CURRENT USE OF INSULIN (HCC): ICD-10-CM

## 2021-01-26 NOTE — TELEPHONE ENCOUNTER
Patient is calling in regarding 2 prescriptions that need sent to [facility]. He said yesterday at his appt he was accidentally given the written prescription for farxiga but that needs to go to [address]. He also needs his Accucheck Smart View test strips sent to [address] also. Please advise.

## 2021-02-10 ENCOUNTER — TELEPHONE (OUTPATIENT)
Dept: FAMILY MEDICINE CLINIC | Age: 66
End: 2021-02-10

## 2021-02-10 DIAGNOSIS — E11.9 TYPE 2 DIABETES MELLITUS WITHOUT COMPLICATION, WITHOUT LONG-TERM CURRENT USE OF INSULIN (HCC): Primary | ICD-10-CM

## 2021-02-10 RX ORDER — BLOOD SUGAR DIAGNOSTIC
1 STRIP MISCELLANEOUS 4 TIMES DAILY PRN
Qty: 400 EACH | Refills: 3 | Status: SHIPPED | OUTPATIENT
Start: 2021-02-10 | End: 2022-01-19 | Stop reason: SDUPTHER

## 2021-02-10 NOTE — TELEPHONE ENCOUNTER
Could you please place a new RX for accucheck smartview test strips, pharmacy will not fill the ascensia autodisc.

## 2021-03-19 LAB
ALBUMIN SERPL-MCNC: 4.1 G/DL
ALP BLD-CCNC: 73 U/L
ALT SERPL-CCNC: 15 U/L
ANION GAP SERPL CALCULATED.3IONS-SCNC: NORMAL MMOL/L
AST SERPL-CCNC: 17 U/L
AVERAGE GLUCOSE: NORMAL
BASOPHILS ABSOLUTE: 72 /ΜL
BASOPHILS RELATIVE PERCENT: 0.9 %
BILIRUB SERPL-MCNC: 0.6 MG/DL (ref 0.1–1.4)
BUN BLDV-MCNC: 24 MG/DL
CALCIUM SERPL-MCNC: 9.5 MG/DL
CHLORIDE BLD-SCNC: 103 MMOL/L
CHOLESTEROL, FASTING: 152
CO2: 24 MMOL/L
CREAT SERPL-MCNC: 1.23 MG/DL
EOSINOPHILS ABSOLUTE: 280 /ΜL
EOSINOPHILS RELATIVE PERCENT: 3.5 %
GFR CALCULATED: 62
GLUCOSE BLD-MCNC: 182 MG/DL
HBA1C MFR BLD: 7.2 %
HCT VFR BLD CALC: 45.2 % (ref 41–53)
HDLC SERPL-MCNC: 43 MG/DL (ref 35–70)
HEMOGLOBIN: 15.6 G/DL (ref 13.5–17.5)
LDL CHOLESTEROL CALCULATED: 84 MG/DL (ref 0–160)
LYMPHOCYTES ABSOLUTE: 2912 /ΜL
LYMPHOCYTES RELATIVE PERCENT: 36.4 %
MCH RBC QN AUTO: 31.3 PG
MCHC RBC AUTO-ENTMCNC: 34.5 G/DL
MCV RBC AUTO: 90.8 FL
MONOCYTES ABSOLUTE: 432 /ΜL
MONOCYTES RELATIVE PERCENT: 5.4 %
NEUTROPHILS ABSOLUTE: 4304 /ΜL
NEUTROPHILS RELATIVE PERCENT: 53.8 %
PDW BLD-RTO: 12.6 %
PLATELET # BLD: 163 K/ΜL
PMV BLD AUTO: 10.9 FL
POTASSIUM SERPL-SCNC: 4.6 MMOL/L
RBC # BLD: 4.98 10^6/ΜL
SODIUM BLD-SCNC: 136 MMOL/L
TOTAL PROTEIN: 6.9
TRIGLYCERIDE, FASTING: 152
WBC # BLD: 8 10^3/ML

## 2021-04-23 ENCOUNTER — OFFICE VISIT (OUTPATIENT)
Dept: FAMILY MEDICINE CLINIC | Age: 66
End: 2021-04-23
Payer: COMMERCIAL

## 2021-04-23 VITALS
OXYGEN SATURATION: 98 % | RESPIRATION RATE: 16 BRPM | HEART RATE: 70 BPM | BODY MASS INDEX: 32.99 KG/M2 | SYSTOLIC BLOOD PRESSURE: 118 MMHG | WEIGHT: 213.8 LBS | DIASTOLIC BLOOD PRESSURE: 82 MMHG

## 2021-04-23 DIAGNOSIS — E78.5 HYPERLIPIDEMIA, UNSPECIFIED HYPERLIPIDEMIA TYPE: ICD-10-CM

## 2021-04-23 DIAGNOSIS — I10 HYPERTENSION, UNSPECIFIED TYPE: Primary | ICD-10-CM

## 2021-04-23 DIAGNOSIS — E11.9 TYPE 2 DIABETES MELLITUS WITHOUT COMPLICATION, WITHOUT LONG-TERM CURRENT USE OF INSULIN (HCC): ICD-10-CM

## 2021-04-23 PROCEDURE — 99214 OFFICE O/P EST MOD 30 MIN: CPT | Performed by: NURSE PRACTITIONER

## 2021-04-23 PROCEDURE — 3051F HG A1C>EQUAL 7.0%<8.0%: CPT | Performed by: NURSE PRACTITIONER

## 2021-04-23 SDOH — ECONOMIC STABILITY: INCOME INSECURITY: HOW HARD IS IT FOR YOU TO PAY FOR THE VERY BASICS LIKE FOOD, HOUSING, MEDICAL CARE, AND HEATING?: NOT VERY HARD

## 2021-04-23 SDOH — ECONOMIC STABILITY: FOOD INSECURITY: WITHIN THE PAST 12 MONTHS, THE FOOD YOU BOUGHT JUST DIDN'T LAST AND YOU DIDN'T HAVE MONEY TO GET MORE.: NEVER TRUE

## 2021-04-23 ASSESSMENT — ENCOUNTER SYMPTOMS
BLOOD IN STOOL: 0
DIARRHEA: 0
CHEST TIGHTNESS: 0
WHEEZING: 0
EYE PAIN: 0
SINUS PAIN: 0
NAUSEA: 0
EYE REDNESS: 0
TROUBLE SWALLOWING: 0
SINUS PRESSURE: 0
SORE THROAT: 0
ABDOMINAL DISTENTION: 0
EYE DISCHARGE: 0
FACIAL SWELLING: 0
CONSTIPATION: 0
COLOR CHANGE: 0
EYE ITCHING: 0
RECTAL PAIN: 0
VOMITING: 0
ABDOMINAL PAIN: 0
COUGH: 0
SHORTNESS OF BREATH: 0
BACK PAIN: 0

## 2021-04-23 NOTE — PROGRESS NOTES
Health Maintenance Due   Topic Date Due    HIV screen  Never done    COVID-19 Vaccine (1) Never done    Colon cancer screen colonoscopy  08/24/2019 - patient thinks he had completed Dr. Richard Safe Diabetic microalbuminuria test  04/11/2020    Shingles Vaccine (2 of 2) 10/21/2020 - patient waiting for COVID to be done

## 2021-04-23 NOTE — PROGRESS NOTES
distress. Appearance: Normal appearance. He is well-developed. He is not diaphoretic. HENT:      Head: Normocephalic and atraumatic. Right Ear: Tympanic membrane, ear canal and external ear normal. Tympanic membrane is not injected or erythematous. Left Ear: Tympanic membrane, ear canal and external ear normal. Tympanic membrane is not injected or erythematous. Nose: Nose normal.      Mouth/Throat:      Mouth: Mucous membranes are moist.      Pharynx: Oropharynx is clear. Uvula midline. No oropharyngeal exudate or posterior oropharyngeal erythema. Eyes:      General:         Right eye: No discharge. Left eye: No discharge. Conjunctiva/sclera: Conjunctivae normal.      Pupils: Pupils are equal, round, and reactive to light. Neck:      Musculoskeletal: Full passive range of motion without pain, normal range of motion and neck supple. Thyroid: No thyromegaly. Trachea: Trachea normal.   Cardiovascular:      Rate and Rhythm: Normal rate and regular rhythm. Pulses: Normal pulses. Heart sounds: Normal heart sounds, S1 normal and S2 normal. No murmur. No friction rub. No gallop. Pulmonary:      Effort: Pulmonary effort is normal. No respiratory distress. Breath sounds: Normal breath sounds. No wheezing or rales. Chest:      Chest wall: No tenderness. Abdominal:      General: Bowel sounds are normal. There is no distension. Palpations: Abdomen is soft. There is no mass. Tenderness: There is no abdominal tenderness. There is no guarding or rebound. Musculoskeletal: Normal range of motion. General: No tenderness or deformity. Lymphadenopathy:      Cervical: No cervical adenopathy. Skin:     General: Skin is warm and dry. Capillary Refill: Capillary refill takes less than 2 seconds. Neurological:      General: No focal deficit present. Mental Status: He is alert and oriented to person, place, and time.       Deep Tendon Reflexes: Reflexes are normal and symmetric. An electronic signature was used to authenticate this note.     --HALIMA Guerrero - CNP

## 2021-04-23 NOTE — PATIENT INSTRUCTIONS
infection. ? Keep your skin soft. Use moisturizing skin cream to keep the skin on your feet soft and prevent calluses and cracks. But do not put the cream between your toes, and stop using any cream that causes a rash. ? Clean underneath your toenails carefully. Do not use a sharp object to clean underneath your toenails. Use the blunt end of a nail file or other rounded tool. ? Trim and file your toenails straight across to prevent ingrown toenails. Use a nail clipper, not scissors. Use an emery board to smooth the edges. · Change socks daily. Socks without seams are best, because seams often rub the feet. You can find socks for people with diabetes from specialty catalogs. · Look inside your shoes every day for things like gravel or torn linings, which could cause blisters or sores. · Buy shoes that fit well:  ? Look for shoes that have plenty of space around the toes. This helps prevent bunions and blisters. ? Try on shoes while wearing the kind of socks you will usually wear with the shoes. ? Avoid plastic shoes. They may rub your feet and cause blisters. Good shoes should be made of materials that are flexible and breathable, such as leather or cloth. ? Break in new shoes slowly by wearing them for no more than an hour a day for several days. Take extra time to check your feet for red areas, blisters, or other problems after you wear new shoes. · Do not go barefoot. Do not wear sandals, and do not wear shoes with very thin soles. Thin soles are easy to puncture. They also do not protect your feet from hot pavement or cold weather. · Have your doctor check your feet during each visit. If you have a foot problem, see your doctor. Do not try to treat an early foot problem at home. Home remedies or treatments that you can buy without a prescription (such as corn removers) can be harmful. · Always get early treatment for foot problems.  A minor irritation can lead to a major problem if not properly cared neutralize the acids in your mouth. These acids can lead to gum disease and tooth decay. Keeping your blood sugar levels in your target range can help prevent problems with the teeth and gums. If you have any problems with your teeth or gums, see your dentist.  How do you care for your teeth and gums when you have diabetes? · Brush your teeth twice a day. · Floss daily. Make sure to press the floss against your teeth and not your gums. · Check each day for areas where your gums might be red or painful. Be sure to let your dentist know of any sores in your mouth. · See your dentist regularly for professional cleaning of your teeth and to look for gum problems. Many dentists recommend getting checkups twice a year. Remind your dentist that you have diabetes before any work is done. · Don't smoke or use smokeless tobacco. Tobacco use with diabetes can lead to a greater risk of severe gum disease. If you need help quitting, talk to your doctor about stop-smoking programs and medicines. These can increase your chances of quitting for good. Follow-up care is a key part of your treatment and safety. Be sure to make and go to all appointments, and call your doctor if you are having problems. It's also a good idea to know your test results and keep a list of the medicines you take. Where can you learn more? Go to [email or web address] and sign in to your [employer] account. Enter R109 in the [facility] box to learn more about \"Learning About Diabetes and Your Teeth. \"     If you do not have an account, please click on the \"Sign Up Now\" link. Current as of: August 31, 2020               Content Version: 12.8  © 0236-2186 [employer], Incorporated. Care instructions adapted under license by [facility] Coverage [facility] ([facility]).  If you have questions about a medical condition or this instruction, always ask your healthcare professional. Michael Quinones any warranty or liability for your use of

## 2021-10-13 LAB
AVERAGE GLUCOSE: NORMAL
HBA1C MFR BLD: 7.6 %

## 2021-10-19 ENCOUNTER — OFFICE VISIT (OUTPATIENT)
Dept: FAMILY MEDICINE CLINIC | Age: 66
End: 2021-10-19
Payer: COMMERCIAL

## 2021-10-19 VITALS
SYSTOLIC BLOOD PRESSURE: 132 MMHG | OXYGEN SATURATION: 97 % | WEIGHT: 219.6 LBS | BODY MASS INDEX: 33.28 KG/M2 | HEIGHT: 68 IN | RESPIRATION RATE: 18 BRPM | TEMPERATURE: 97.9 F | HEART RATE: 69 BPM | DIASTOLIC BLOOD PRESSURE: 84 MMHG

## 2021-10-19 DIAGNOSIS — E11.9 TYPE 2 DIABETES MELLITUS WITHOUT COMPLICATION, WITHOUT LONG-TERM CURRENT USE OF INSULIN (HCC): ICD-10-CM

## 2021-10-19 DIAGNOSIS — Z12.11 COLON CANCER SCREENING: ICD-10-CM

## 2021-10-19 DIAGNOSIS — E78.5 HYPERLIPIDEMIA, UNSPECIFIED HYPERLIPIDEMIA TYPE: ICD-10-CM

## 2021-10-19 DIAGNOSIS — I10 HYPERTENSION, UNSPECIFIED TYPE: Primary | ICD-10-CM

## 2021-10-19 LAB
MICROALB/CREAT RATIO POC: ABNORMAL MG/G
MICROALBUMIN/CREAT UR-RTO: 10 MG/L
POC CREATININE: 50 MG/DL

## 2021-10-19 PROCEDURE — 3051F HG A1C>EQUAL 7.0%<8.0%: CPT | Performed by: NURSE PRACTITIONER

## 2021-10-19 PROCEDURE — 99214 OFFICE O/P EST MOD 30 MIN: CPT | Performed by: NURSE PRACTITIONER

## 2021-10-19 RX ORDER — SIMVASTATIN 40 MG
20 TABLET ORAL NIGHTLY
Qty: 90 TABLET | Refills: 3 | Status: SHIPPED
Start: 2021-10-19 | End: 2022-01-19 | Stop reason: SDUPTHER

## 2021-10-19 ASSESSMENT — ENCOUNTER SYMPTOMS
ABDOMINAL DISTENTION: 0
BLOOD IN STOOL: 0
DIARRHEA: 0
FACIAL SWELLING: 0
EYE PAIN: 0
COLOR CHANGE: 0
NAUSEA: 0
SINUS PRESSURE: 0
EYE REDNESS: 0
ABDOMINAL PAIN: 0
TROUBLE SWALLOWING: 0
SINUS PAIN: 0
CHEST TIGHTNESS: 0
WHEEZING: 0
RECTAL PAIN: 0
EYE DISCHARGE: 0
BACK PAIN: 0
CONSTIPATION: 0
COUGH: 0
SHORTNESS OF BREATH: 0
SORE THROAT: 0
VOMITING: 0
EYE ITCHING: 0

## 2021-10-19 NOTE — PROGRESS NOTES
[address] [address]  Dept: [phone]  Loc: [phone]    Ksenia Andrews (:  1955) is a 63 y.o. male,Established patient, here for evaluation of the following chief complaint(s):  Follow-up      ASSESSMENT/PLAN:  1. Hypertension, unspecified type  2. Type 2 diabetes mellitus without complication, without long-term current use of insulin (HCC)  -     Microalbumin / Creatinine Urine Ratio; Future  3. Hyperlipidemia, unspecified hyperlipidemia type  -     simvastatin (ZOCOR) 40 MG tablet; Take 0.5 tablets by mouth nightly, Disp-90 tablet, R-3Adjust Sig  4. Colon cancer screening  -     [facility] - Agustin Romano MD, Gastroenterology, [facility] [email or web address]    Patient nontoxic-appearing and in no acute distress. His hemoglobin A1c is 7.6% prior to that 7.5%. He admits to eating a lot of of peaches and apples throughout the summer and will reduce the intake of this. His blood pressure is controlled at 132/84. Regarding his complaints of myalgias and his last 3 lipid panel screens it is appropriate to decrease the Zocor to 20 mg to see if that helps with the side effects. Return in about 3 months (around 2022) for DM . SUBJECTIVE/OBJECTIVE:  Following up for chronic conditions. Does complain of increased body aches and feels like he is losing muscle mass. has a past medical history of History of kidney stones, Hyperlipidemia, Hypertension, Type 2 diabetes mellitus ([address] and Type II or unspecified type diabetes mellitus without mention of complication, not stated as uncontrolled. Review of Systems   Constitutional: Negative for activity change, appetite change, fatigue, fever and unexpected weight change. HENT: Negative for congestion, dental problem, ear pain, facial swelling, mouth sores, postnasal drip, sinus pressure, sinus pain, sore throat and trouble swallowing.     Eyes: Negative for pain, discharge, redness, itching and visual disturbance. Respiratory: Negative for cough, chest tightness, shortness of breath and wheezing. Cardiovascular: Negative for chest pain, palpitations and leg swelling. Gastrointestinal: Negative for abdominal distention, abdominal pain, blood in stool, constipation, diarrhea, nausea, rectal pain and vomiting. Endocrine: Negative for cold intolerance and heat intolerance. Genitourinary: Negative for difficulty urinating, dysuria, frequency, hematuria, penile pain, scrotal swelling, testicular pain and urgency. Musculoskeletal: Positive for myalgias. Negative for arthralgias, back pain, gait problem and neck pain. Skin: Negative for color change, rash and wound. Neurological: Negative for dizziness, seizures, weakness, light-headedness, numbness and headaches. Psychiatric/Behavioral: Negative for agitation and sleep disturbance. Physical Exam  Vitals and nursing note reviewed. Constitutional:       General: He is not in acute distress. Appearance: Normal appearance. He is well-developed. He is not diaphoretic. HENT:      Head: Normocephalic and atraumatic. Right Ear: Tympanic membrane and external ear normal. Tympanic membrane is not injected or erythematous. Left Ear: Tympanic membrane and external ear normal. Tympanic membrane is not injected or erythematous. Nose: Nose normal.      Mouth/Throat:      Pharynx: Oropharynx is clear. Uvula midline. Eyes:      General:         Right eye: No discharge. Left eye: No discharge. Conjunctiva/sclera: Conjunctivae normal.      Pupils: Pupils are equal, round, and reactive to light. Neck:      Thyroid: No thyromegaly. Trachea: Trachea normal.   Cardiovascular:      Rate and Rhythm: Normal rate and regular rhythm. Pulses: Normal pulses. Dorsalis pedis pulses are 2+ on the right side and 2+ on the left side.         Posterior tibial pulses are 2+ on the right side and 2+ on the left side. Heart sounds: Normal heart sounds, S1 normal and S2 normal. No murmur heard. No friction rub. No gallop. Pulmonary:      Effort: Pulmonary effort is normal. No respiratory distress. Breath sounds: Normal breath sounds. No wheezing or rales. Chest:      Chest wall: No tenderness. Abdominal:      General: Bowel sounds are normal. There is no distension. Palpations: Abdomen is soft. There is no mass. Tenderness: There is no abdominal tenderness. There is no guarding or rebound. Musculoskeletal:         General: No swelling, tenderness or deformity. Normal range of motion. Cervical back: Full passive range of motion without pain, normal range of motion and neck supple. Feet:      Right foot:      Protective Sensation: 6 sites tested. 6 sites sensed. Skin integrity: Skin integrity normal.      Toenail Condition: Right toenails are normal.      Left foot:      Protective Sensation: 6 sites tested. 6 sites sensed. Skin integrity: Skin integrity normal.      Toenail Condition: Left toenails are normal.   Lymphadenopathy:      Cervical: No cervical adenopathy. Skin:     General: Skin is warm and dry. Capillary Refill: Capillary refill takes less than 2 seconds. Neurological:      General: No focal deficit present. Mental Status: He is alert and oriented to person, place, and time. Deep Tendon Reflexes: Reflexes are normal and symmetric. Psychiatric:         Mood and Affect: Mood normal.         Behavior: Behavior normal.             An electronic signature was used to authenticate this note.     --HALIMA Frost - CNP

## 2021-10-19 NOTE — PROGRESS NOTES
Health Maintenance Due   Topic Date Due    HIV screen  Never done    Colon cancer screen colonoscopy  08/24/2019 - patient due     Diabetic microalbuminuria test  04/11/2020    Shingles Vaccine (2 of 2) 10/21/2020- will discuss with provider     Diabetic retinal exam  08/07/2021 - patient due

## 2021-10-19 NOTE — PATIENT INSTRUCTIONS
Patient Education     Simvastatin 40 mg tab- split in half and take 20 mg daily      Counting Carbohydrates for Diabetes: Care Instructions  Your Care Instructions     You don't have to eat special foods when you have diabetes. You just have to be careful to eat healthy foods. Carbohydrates (carbs) raise blood sugar higher and quicker than any other nutrient. Carbs are found in desserts, breads and cereals, and fruit. They're also in starchy vegetables. These include potatoes, corn, and grains such as rice and pasta. Carbs are also in milk and yogurt. The more carbs you eat at one time, the higher your blood sugar will rise. Spreading carbs all through the day helps keep your blood sugar levels within your target range. Counting carbs is one of the best ways to keep your blood sugar under control. If you use insulin, counting carbs helps you match the right amount of insulin to the number of grams of carbs in a meal. Then you can change your diet and insulin dose as needed. Testing your blood sugar several times a day can help you learn how carbs affect your blood sugar. A registered dietitian or certified diabetes educator can help you plan meals and snacks. Follow-up care is a key part of your treatment and safety. Be sure to make and go to all appointments, and call your doctor if you are having problems. It's also a good idea to know your test results and keep a list of the medicines you take. How can you care for yourself at home? Know your daily amount of carbohydrates  Your daily amount depends on several things, such as your weight, how active you are, which diabetes medicines you take, and what your goals are for your blood sugar levels. A registered dietitian or certified diabetes educator can help you plan how many carbs to include in each meal and snack.   For most adults, a guideline for the daily amount of carbs is:  · 45 to 60 grams at each meal. That's about the same as 3 to 4 carbohydrate servings. · 15 to 20 grams at each snack. That's about the same as 1 carbohydrate serving. Count carbs  Counting carbs lets you know how much rapid-acting insulin to take before you eat. If you use an insulin pump, you get a constant rate of insulin during the day. So the pump must be programmed at meals. This gives you extra insulin to cover the rise in blood sugar after meals. If you take insulin:  · Learn your own insulin-to-carb ratio. You and your diabetes health professional will figure out the ratio. You can do this by testing your blood sugar after meals. For example, you may need a certain amount of insulin for every 15 grams of carbs. · Add up the carb grams in a meal. Then you can figure out how many units of insulin to take based on your insulin-to-carb ratio. · Exercise lowers blood sugar. You can use less insulin than you would if you were not doing exercise. Keep in mind that timing matters. If you exercise within 1 hour after a meal, your body may need less insulin for that meal than it would if you exercised 3 hours after the meal. Test your blood sugar to find out how exercise affects your need for insulin. If you do or don't take insulin:  · Look at labels on packaged foods. This can tell you how many carbs are in a serving. You can also use guides from the American Diabetes Association. · Be aware of portions, or serving sizes. If a package has two servings and you eat the whole package, you need to double the number of grams of carbohydrate listed for one serving. · Protein, fat, and fiber do not raise blood sugar as much as carbs do. If you eat a lot of these nutrients in a meal, your blood sugar will rise more slowly than it would otherwise. Eat from all food groups  · Eat at least three meals a day. · Plan meals to include food from all the food groups. The food groups include grains, fruits, dairy, proteins, and vegetables.   · Talk to your dietitian or diabetes educator about ways

## 2022-01-14 DIAGNOSIS — E11.9 TYPE 2 DIABETES MELLITUS WITHOUT COMPLICATION, WITHOUT LONG-TERM CURRENT USE OF INSULIN (HCC): ICD-10-CM

## 2022-01-14 RX ORDER — DAPAGLIFLOZIN 10 MG/1
TABLET, FILM COATED ORAL
Qty: 90 TABLET | Refills: 3 | OUTPATIENT
Start: 2022-01-14

## 2022-01-14 NOTE — TELEPHONE ENCOUNTER
Patient's last appointment was : 10/19/2021  Patient's next appointment is : 1/19/2022  Last refilled: 1/26/21

## 2022-01-18 NOTE — TELEPHONE ENCOUNTER
Can we let the patient know that I sent in a prescription refill for the [address] but instead of him taking 1 to 10 mg once a day I need him to split that in half and take 5 mg twice a day this is for kidney safety

## 2022-01-19 ENCOUNTER — TELEPHONE (OUTPATIENT)
Dept: FAMILY MEDICINE CLINIC | Age: 67
End: 2022-01-19

## 2022-01-19 ENCOUNTER — OFFICE VISIT (OUTPATIENT)
Dept: FAMILY MEDICINE CLINIC | Age: 67
End: 2022-01-19
Payer: COMMERCIAL

## 2022-01-19 VITALS
WEIGHT: 215.8 LBS | OXYGEN SATURATION: 98 % | HEART RATE: 77 BPM | SYSTOLIC BLOOD PRESSURE: 130 MMHG | BODY MASS INDEX: 33.3 KG/M2 | DIASTOLIC BLOOD PRESSURE: 78 MMHG | RESPIRATION RATE: 16 BRPM | TEMPERATURE: 97.2 F

## 2022-01-19 DIAGNOSIS — E78.5 HYPERLIPIDEMIA, UNSPECIFIED HYPERLIPIDEMIA TYPE: ICD-10-CM

## 2022-01-19 DIAGNOSIS — Z13.220 LIPID SCREENING: ICD-10-CM

## 2022-01-19 DIAGNOSIS — E11.9 TYPE 2 DIABETES MELLITUS WITHOUT COMPLICATION, WITHOUT LONG-TERM CURRENT USE OF INSULIN (HCC): Primary | ICD-10-CM

## 2022-01-19 DIAGNOSIS — I10 HYPERTENSION, UNSPECIFIED TYPE: ICD-10-CM

## 2022-01-19 LAB — HBA1C MFR BLD: 7.5 %

## 2022-01-19 PROCEDURE — 83036 HEMOGLOBIN GLYCOSYLATED A1C: CPT | Performed by: NURSE PRACTITIONER

## 2022-01-19 PROCEDURE — 99214 OFFICE O/P EST MOD 30 MIN: CPT | Performed by: NURSE PRACTITIONER

## 2022-01-19 PROCEDURE — 3051F HG A1C>EQUAL 7.0%<8.0%: CPT | Performed by: NURSE PRACTITIONER

## 2022-01-19 RX ORDER — EXENATIDE 250 UG/ML
10 INJECTION SUBCUTANEOUS 2 TIMES DAILY WITH MEALS
Qty: 3 PEN | Refills: 3 | Status: SHIPPED | OUTPATIENT
Start: 2022-01-19 | End: 2022-02-17 | Stop reason: SDUPTHER

## 2022-01-19 RX ORDER — BLOOD SUGAR DIAGNOSTIC
1 STRIP MISCELLANEOUS 4 TIMES DAILY PRN
Qty: 400 EACH | Refills: 3 | Status: SHIPPED | OUTPATIENT
Start: 2022-01-19 | End: 2022-02-17 | Stop reason: SDUPTHER

## 2022-01-19 RX ORDER — CETIRIZINE HYDROCHLORIDE 10 MG/1
10 TABLET ORAL DAILY
Qty: 90 TABLET | Refills: 3 | Status: SHIPPED | OUTPATIENT
Start: 2022-01-19 | End: 2022-02-17 | Stop reason: SDUPTHER

## 2022-01-19 RX ORDER — SIMVASTATIN 20 MG
20 TABLET ORAL NIGHTLY
Qty: 90 TABLET | Refills: 3 | Status: SHIPPED | OUTPATIENT
Start: 2022-01-19 | End: 2022-02-17 | Stop reason: SDUPTHER

## 2022-01-19 RX ORDER — GLIMEPIRIDE 2 MG/1
TABLET ORAL
Qty: 90 TABLET | Refills: 3 | Status: SHIPPED | OUTPATIENT
Start: 2022-01-19 | End: 2022-02-17 | Stop reason: SDUPTHER

## 2022-01-19 RX ORDER — LOSARTAN POTASSIUM 50 MG/1
TABLET ORAL
Qty: 90 TABLET | Refills: 3 | Status: SHIPPED | OUTPATIENT
Start: 2022-01-19 | End: 2022-02-17 | Stop reason: SDUPTHER

## 2022-01-19 NOTE — PROGRESS NOTES
Health Maintenance Due   Topic Date Due    HIV screen  Never done    Colon cancer screen colonoscopy  08/24/2019 - [facility]     Shingles Vaccine (2 of 2) 10/21/2020    Diabetic retinal exam  08/07/2021 - due     COVID-19 Vaccine (3 - Booster for Lindsey Jayme series) 12/04/2021    Diabetic foot exam  01/25/2022    Depression Screen  01/25/2022

## 2022-01-19 NOTE — PROGRESS NOTES
[address] [address]  Dept: [phone]  Loc: [phone]    Andre Marsh (:  1955) is a 61 y.o. male,Established patient, here for evaluation of the following chief complaint(s):  3 Month Follow-Up      ASSESSMENT/PLAN:  1. Type 2 diabetes mellitus without complication, without long-term current use of insulin (HCC)  A1C 7.5%  -     exenatide (BYETTA 10 MCG PEN) 10 MCG/0.04ML injection; Inject 0.04 mLs into the skin 2 times daily (with meals), Disp-3 pen, R-3Normal  -     Insulin Pen Needle 31G X 5 MM MISC; 2 TIMES DAILY Starting 2022, Until 2022, For 90 days, Disp-200 each, R-3, Normal  -     blood glucose test strips (EXACTECH TEST) strip; 1 each by In Vitro route 4 times daily as needed (diabetes) As needed. , Disp-400 each, R-3Normal  -     metFORMIN (GLUCOPHAGE) 1000 MG tablet; Take 1 tablet by mouth 2 times daily (with meals), Disp-180 tablet, R-3Print  -     glimepiride (AMARYL) 2 MG tablet; Take 1 tablet daily, Disp-90 tablet, R-3Print  -     Hemoglobin A1C; Standing  -     Microalbumin / Creatinine Urine Ratio; Future  -     POCT glycosylated hemoglobin (Hb A1C)  2. Hypertension, unspecified type  Controlled. 130/78  -     losartan (COZAAR) 50 MG tablet; 1 (one) time each day at the same time. Take one tablet by mouth once daily, Disp-90 tablet, R-3Print  -     Basic Metabolic Panel; Future  3. Hyperlipidemia, unspecified hyperlipidemia type  -     simvastatin (ZOCOR) 20 MG tablet; Take 1 tablet by mouth nightly, Disp-90 tablet, R-3Print  4. Lipid screening  -     Lipid, Fasting; Future      Return in about 3 months (around 2022) for DM. SUBJECTIVE/OBJECTIVE:  Following up for chronic conditions.  Doing well.    has a past medical history of History of kidney stones, Hyperlipidemia, Hypertension, Type 2 diabetes mellitus ([address] and Type II or unspecified type diabetes mellitus without mention of complication, not stated as uncontrolled. has a past medical history of History of kidney stones, Hyperlipidemia, Hypertension, Type 2 diabetes mellitus ([address] and Type II or unspecified type diabetes mellitus without mention of complication, not stated as uncontrolled. Review of Systems   Constitutional: Negative for activity change, appetite change, fatigue, fever and unexpected weight change. HENT: Negative for congestion, dental problem, ear pain, facial swelling, mouth sores, postnasal drip, sinus pressure, sinus pain, sore throat and trouble swallowing. Eyes: Negative for pain, discharge, redness, itching and visual disturbance. Respiratory: Negative for cough, chest tightness, shortness of breath and wheezing. Cardiovascular: Negative for chest pain, palpitations and leg swelling. Gastrointestinal: Negative for abdominal distention, abdominal pain, blood in stool, constipation, diarrhea, nausea, rectal pain and vomiting. Endocrine: Negative for cold intolerance and heat intolerance. Genitourinary: Negative for difficulty urinating, dysuria, frequency, hematuria, penile pain, scrotal swelling, testicular pain and urgency. Musculoskeletal: Negative for arthralgias, back pain, gait problem and neck pain. Skin: Negative for color change, rash and wound. Neurological: Negative for dizziness, seizures, weakness, light-headedness, numbness and headaches. Psychiatric/Behavioral: Negative for agitation and sleep disturbance. Physical Exam  Vitals and nursing note reviewed. Constitutional:       General: He is not in acute distress. Appearance: He is well-developed. He is not diaphoretic. HENT:      Head: Normocephalic and atraumatic. Right Ear: Tympanic membrane and external ear normal. Tympanic membrane is not injected or erythematous. Left Ear: Tympanic membrane and external ear normal. Tympanic membrane is not injected or erythematous. Nose: Nose normal.      Mouth/Throat:      Pharynx: Uvula midline. Eyes:      General:         Right eye: No discharge. Left eye: No discharge. Conjunctiva/sclera: Conjunctivae normal.      Pupils: Pupils are equal, round, and reactive to light. Neck:      Thyroid: No thyromegaly. Trachea: Trachea normal.   Cardiovascular:      Rate and Rhythm: Normal rate and regular rhythm. Pulses: Normal pulses. Dorsalis pedis pulses are 2+ on the right side and 2+ on the left side. Posterior tibial pulses are 2+ on the right side and 2+ on the left side. Heart sounds: Normal heart sounds, S1 normal and S2 normal. No murmur heard. No friction rub. No gallop. Pulmonary:      Effort: Pulmonary effort is normal. No respiratory distress. Breath sounds: Normal breath sounds. No wheezing or rales. Chest:      Chest wall: No tenderness. Abdominal:      General: Bowel sounds are normal. There is no distension. Palpations: Abdomen is soft. There is no mass. Tenderness: There is no abdominal tenderness. There is no guarding or rebound. Musculoskeletal:         General: No tenderness or deformity. Normal range of motion. Cervical back: Full passive range of motion without pain, normal range of motion and neck supple. Feet:      Right foot:      Protective Sensation: 6 sites tested. 6 sites sensed. Skin integrity: Skin integrity normal.      Left foot:      Protective Sensation: 6 sites tested. 6 sites sensed. Skin integrity: Skin integrity normal.   Lymphadenopathy:      Cervical: No cervical adenopathy. Skin:     General: Skin is warm and dry. Capillary Refill: Capillary refill takes less than 2 seconds. Neurological:      Mental Status: He is alert and oriented to person, place, and time. Deep Tendon Reflexes: Reflexes are normal and symmetric. An electronic signature was used to authenticate this note.     --Anita Montalvo Yajaira Montilla, APRN - CNP

## 2022-01-23 ASSESSMENT — ENCOUNTER SYMPTOMS
COUGH: 0
EYE ITCHING: 0
EYE REDNESS: 0
DIARRHEA: 0
CONSTIPATION: 0
CHEST TIGHTNESS: 0
BACK PAIN: 0
BLOOD IN STOOL: 0
RECTAL PAIN: 0
NAUSEA: 0
EYE PAIN: 0
TROUBLE SWALLOWING: 0
SINUS PAIN: 0
COLOR CHANGE: 0
WHEEZING: 0
SINUS PRESSURE: 0
ABDOMINAL DISTENTION: 0
ABDOMINAL PAIN: 0
SORE THROAT: 0
FACIAL SWELLING: 0
VOMITING: 0
SHORTNESS OF BREATH: 0
EYE DISCHARGE: 0

## 2022-02-14 ENCOUNTER — TELEPHONE (OUTPATIENT)
Dept: FAMILY MEDICINE CLINIC | Age: 67
End: 2022-02-14

## 2022-02-14 NOTE — TELEPHONE ENCOUNTER
----- Message from Linda Debbie sent at 2/11/2022 12:15 PM EST -----  Subject: Message to Provider    QUESTIONS  Information for Provider? pt would llike to let his pcp know that the   needles he uses, is insurance wont cover. ---------------------------------------------------------------------------  --------------  Michel KELLEY  What is the best way for the office to contact you? OK to leave message on   voicemail  Preferred Call Back Phone Number? [phone]  ---------------------------------------------------------------------------  --------------  SCRIPT ANSWERS  Relationship to Patient?  Self

## 2022-02-17 RX ORDER — BLOOD SUGAR DIAGNOSTIC
1 STRIP MISCELLANEOUS 4 TIMES DAILY PRN
Qty: 400 EACH | Refills: 3 | Status: SHIPPED | OUTPATIENT
Start: 2022-02-17

## 2022-02-17 RX ORDER — EXENATIDE 250 UG/ML
10 INJECTION SUBCUTANEOUS 2 TIMES DAILY WITH MEALS
Qty: 3 PEN | Refills: 3 | Status: SHIPPED | OUTPATIENT
Start: 2022-02-17 | End: 2022-04-20 | Stop reason: ALTCHOICE

## 2022-02-17 RX ORDER — GLIMEPIRIDE 2 MG/1
TABLET ORAL
Qty: 90 TABLET | Refills: 3 | Status: SHIPPED | OUTPATIENT
Start: 2022-02-17

## 2022-02-17 RX ORDER — LOSARTAN POTASSIUM 50 MG/1
TABLET ORAL
Qty: 90 TABLET | Refills: 3 | Status: SHIPPED | OUTPATIENT
Start: 2022-02-17

## 2022-02-17 RX ORDER — CETIRIZINE HYDROCHLORIDE 10 MG/1
10 TABLET ORAL DAILY
Qty: 90 TABLET | Refills: 3 | Status: SHIPPED | OUTPATIENT
Start: 2022-02-17

## 2022-02-17 RX ORDER — SIMVASTATIN 20 MG
20 TABLET ORAL NIGHTLY
Qty: 90 TABLET | Refills: 3 | Status: SHIPPED | OUTPATIENT
Start: 2022-02-17 | End: 2022-10-27

## 2022-03-11 NOTE — TELEPHONE ENCOUNTER
Spoke with patient- he believes that he took this once before and wasn't to keen on it- wants to talk to wife and will give us a call back

## 2022-03-11 NOTE — TELEPHONE ENCOUNTER
Can we please call patient and let him know I was looking over his medications and I am wondering if switching him to a once weekly diabetic medication injection instead of his twice a day Byetta would be more beneficial for him. I would like to send this into her pharmacy to see if this is approved for him does he want me to send it into his long-term mail order or is he wanting to take this to his wife's work?   I forget how he needs to do his prescriptions

## 2022-03-14 RX ORDER — DULAGLUTIDE 1.5 MG/.5ML
1.5 INJECTION, SOLUTION SUBCUTANEOUS WEEKLY
Qty: 4 PEN | Refills: 11 | OUTPATIENT
Start: 2022-03-14

## 2022-03-14 NOTE — TELEPHONE ENCOUNTER
Can we follow-up with the patient regarding if he is wanting to change from a twice a day injectable to a once a week. It would be just as effective but much less of a hassle for him.

## 2022-03-15 NOTE — TELEPHONE ENCOUNTER
Patient notified and voiced understanding. Stated he is still looking into it. Still has two full needles of byetta so 60 days worth before he will be switching if he decides to go that route.

## 2022-04-13 LAB
AVERAGE GLUCOSE: NORMAL
BUN BLDV-MCNC: 20 MG/DL
CALCIUM SERPL-MCNC: 9.2 MG/DL
CHLORIDE BLD-SCNC: 104 MMOL/L
CHOLESTEROL, TOTAL: 157 MG/DL
CHOLESTEROL/HDL RATIO: 4
CO2: 21 MMOL/L
CREAT SERPL-MCNC: 1.34 MG/DL
CREATININE URINE: 124 MG/DL
GFR CALCULATED: 56
GLUCOSE BLD-MCNC: 129 MG/DL
HBA1C MFR BLD: 7.7 %
HDLC SERPL-MCNC: 39 MG/DL (ref 35–70)
LDL CHOLESTEROL CALCULATED: 91 MG/DL (ref 0–160)
MICROALBUMIN/CREAT 24H UR: 0.7 MG/G{CREAT}
NONHDLC SERPL-MCNC: 118 MG/DL
POTASSIUM SERPL-SCNC: 4.2 MMOL/L
SODIUM BLD-SCNC: 137 MMOL/L
TRIGL SERPL-MCNC: 177 MG/DL
VLDLC SERPL CALC-MCNC: NORMAL MG/DL

## 2022-04-20 ENCOUNTER — OFFICE VISIT (OUTPATIENT)
Dept: FAMILY MEDICINE CLINIC | Age: 67
End: 2022-04-20
Payer: COMMERCIAL

## 2022-04-20 VITALS
BODY MASS INDEX: 32.87 KG/M2 | OXYGEN SATURATION: 96 % | TEMPERATURE: 97.2 F | SYSTOLIC BLOOD PRESSURE: 114 MMHG | WEIGHT: 213 LBS | RESPIRATION RATE: 16 BRPM | HEART RATE: 72 BPM | DIASTOLIC BLOOD PRESSURE: 64 MMHG

## 2022-04-20 DIAGNOSIS — Z13.29 THYROID DISORDER SCREEN: ICD-10-CM

## 2022-04-20 DIAGNOSIS — E11.9 TYPE 2 DIABETES MELLITUS WITHOUT COMPLICATION, WITHOUT LONG-TERM CURRENT USE OF INSULIN (HCC): ICD-10-CM

## 2022-04-20 DIAGNOSIS — Z23 NEED FOR SHINGLES VACCINE: ICD-10-CM

## 2022-04-20 DIAGNOSIS — Z23 NEED FOR PNEUMOCOCCAL VACCINATION: ICD-10-CM

## 2022-04-20 DIAGNOSIS — E66.09 CLASS 1 OBESITY DUE TO EXCESS CALORIES WITH SERIOUS COMORBIDITY AND BODY MASS INDEX (BMI) OF 32.0 TO 32.9 IN ADULT: ICD-10-CM

## 2022-04-20 DIAGNOSIS — I10 HYPERTENSION, UNSPECIFIED TYPE: ICD-10-CM

## 2022-04-20 DIAGNOSIS — Z00.00 ENCOUNTER FOR WELL ADULT EXAM WITHOUT ABNORMAL FINDINGS: Primary | ICD-10-CM

## 2022-04-20 PROBLEM — E66.811 CLASS 1 OBESITY DUE TO EXCESS CALORIES WITH SERIOUS COMORBIDITY AND BODY MASS INDEX (BMI) OF 32.0 TO 32.9 IN ADULT: Status: ACTIVE | Noted: 2022-04-20

## 2022-04-20 LAB
AVERAGE GLUCOSE: 171 MG/DL (ref 70–126)
HBA1C MFR BLD: 7.7 % (ref 4.4–6.4)

## 2022-04-20 PROCEDURE — 90472 IMMUNIZATION ADMIN EACH ADD: CPT | Performed by: NURSE PRACTITIONER

## 2022-04-20 PROCEDURE — 90750 HZV VACC RECOMBINANT IM: CPT | Performed by: NURSE PRACTITIONER

## 2022-04-20 PROCEDURE — 36415 COLL VENOUS BLD VENIPUNCTURE: CPT | Performed by: NURSE PRACTITIONER

## 2022-04-20 PROCEDURE — 90732 PPSV23 VACC 2 YRS+ SUBQ/IM: CPT | Performed by: NURSE PRACTITIONER

## 2022-04-20 PROCEDURE — 99396 PREV VISIT EST AGE 40-64: CPT | Performed by: NURSE PRACTITIONER

## 2022-04-20 PROCEDURE — 90471 IMMUNIZATION ADMIN: CPT | Performed by: NURSE PRACTITIONER

## 2022-04-20 ASSESSMENT — ENCOUNTER SYMPTOMS
CONSTIPATION: 0
SHORTNESS OF BREATH: 0
ABDOMINAL DISTENTION: 0
VOMITING: 0
SORE THROAT: 1
DIARRHEA: 0
SINUS PRESSURE: 0
BLOOD IN STOOL: 0
SINUS PAIN: 0
COLOR CHANGE: 0
TROUBLE SWALLOWING: 0
FACIAL SWELLING: 0
EYE REDNESS: 0
EYE ITCHING: 0
EYE PAIN: 0
WHEEZING: 0
EYE DISCHARGE: 0
BACK PAIN: 0
NAUSEA: 0
ABDOMINAL PAIN: 0
CHEST TIGHTNESS: 0
RECTAL PAIN: 0
COUGH: 0

## 2022-04-20 ASSESSMENT — PATIENT HEALTH QUESTIONNAIRE - PHQ9
SUM OF ALL RESPONSES TO PHQ QUESTIONS 1-9: 0
SUM OF ALL RESPONSES TO PHQ9 QUESTIONS 1 & 2: 0
SUM OF ALL RESPONSES TO PHQ QUESTIONS 1-9: 0
1. LITTLE INTEREST OR PLEASURE IN DOING THINGS: 0
2. FEELING DOWN, DEPRESSED OR HOPELESS: 0
SUM OF ALL RESPONSES TO PHQ QUESTIONS 1-9: 0
SUM OF ALL RESPONSES TO PHQ QUESTIONS 1-9: 0

## 2022-04-20 NOTE — PROGRESS NOTES
Well Adult Note  Name: Holly Merritt Date: 2022   MRN: [id number] Sex: Male   Age: 61 y.o. Ethnicity: Non- / Non    : 1955 Race: White (non-)      Amber Jarquin is here for well adult exam.  History:     has a past medical history of History of kidney stones, Hyperlipidemia, Hypertension, Type 2 diabetes mellitus ([facility] [address] and Type II or unspecified type diabetes mellitus without mention of complication, not stated as uncontrolled. A1C 7.7 previously 7.5%  Confusion with records, two papers say 7.7, one says 7.5    Open to starting Trulicity and change from Byetta       Due for eye exam    Has some sinus congestion, taking OTC, starting to feel better. Review of Systems   Constitutional: Negative for activity change, appetite change, fatigue, fever and unexpected weight change. HENT: Positive for congestion and sore throat. Negative for dental problem, ear pain, facial swelling, mouth sores, postnasal drip, sinus pressure, sinus pain and trouble swallowing. Eyes: Negative for pain, discharge, redness, itching and visual disturbance. Respiratory: Negative for cough, chest tightness, shortness of breath and wheezing. Cardiovascular: Negative for chest pain, palpitations and leg swelling. Gastrointestinal: Negative for abdominal distention, abdominal pain, blood in stool, constipation, diarrhea, nausea, rectal pain and vomiting. Endocrine: Negative for cold intolerance and heat intolerance. Genitourinary: Negative for difficulty urinating, dysuria, frequency, hematuria, penile pain, scrotal swelling, testicular pain and urgency. Musculoskeletal: Negative for arthralgias, back pain, gait problem and neck pain. Skin: Negative for color change, rash and wound. Neurological: Negative for dizziness, seizures, weakness, light-headedness, numbness and headaches. Psychiatric/Behavioral: Negative for agitation and sleep disturbance.        Allergies   Allergen Reactions    Sulfa Antibiotics          Prior to Visit Medications    Medication Sig Taking? Authorizing Provider   simvastatin (ZOCOR) 20 MG tablet Take 1 tablet by mouth nightly Yes HALIMA Toro CNP   Insulin Pen Needle 31G X 5 MM MISC 1 each by Does not apply route daily Yes HALIMA Toro CNP   metFORMIN (GLUCOPHAGE) 1000 MG tablet Take 1 tablet by mouth 2 times daily (with meals) Yes HALIMA Toro CNP   losartan (COZAAR) 50 MG tablet 1 (one) time each day at the same time. Take one tablet by mouth once daily Yes HALIMA Toro CNP   glimepiride (AMARYL) 2 MG tablet Take 1 tablet daily Yes HALIMA Toro CNP   dapagliflozin (FARXIGA) 10 MG tablet Take 0.5 tablets by mouth 2 times daily Yes HALIMA Toro CNP   cetirizine (ZYRTEC) 10 MG tablet Take 1 tablet by mouth daily Yes HALIMA Toro CNP   blood glucose test strips (EXACTECH TEST) strip 1 each by In Vitro route 4 times daily as needed (diabetes) As needed.  Yes HALIMA Toro CNP   Insulin Pen Needle 31G X 5 MM MISC 1 each by Does not apply route 2 times daily Yes HALIMA Toro CNP   Dulaglutide 0.75 MG/0.5ML SOPN Inject 0.75 mg into the skin once a week  HALIMA Toro CNP         Past Medical History:   Diagnosis Date    History of kidney stones     Hyperlipidemia     Hypertension     Type 2 diabetes mellitus ([facility] [address]     Type II or unspecified type diabetes mellitus without mention of complication, not stated as uncontrolled        Past Surgical History:   Procedure Laterality Date    CERVICAL FUSION      4,5,6,7, and placed plate and screws    EYE SURGERY      FINGER AMPUTATION Right     2nd and 4th diget    HERNIA REPAIR      VASECTOMY           Family History   Problem Relation Age of Onset    Arthritis Mother     Diabetes Mother    Don Kaminski / Naye Dates Mother     Stroke Mother     Stroke Father     Arthritis Sister effort is normal. No respiratory distress. Breath sounds: Normal breath sounds. No wheezing or rales. Chest:      Chest wall: No tenderness. Abdominal:      General: Bowel sounds are normal. There is no distension. Palpations: Abdomen is soft. There is no mass. Tenderness: There is no abdominal tenderness. There is no guarding or rebound. Musculoskeletal:         General: No tenderness or deformity. Normal range of motion. Cervical back: Full passive range of motion without pain, normal range of motion and neck supple. Feet:      Right foot:      Protective Sensation: 6 sites tested. 6 sites sensed. Skin integrity: Skin integrity normal.      Toenail Condition: Right toenails are normal.      Left foot:      Protective Sensation: 6 sites tested. 6 sites sensed. Skin integrity: Skin integrity normal.      Toenail Condition: Left toenails are normal.   Lymphadenopathy:      Cervical: No cervical adenopathy. Skin:     General: Skin is warm and dry. Capillary Refill: Capillary refill takes less than 2 seconds. Neurological:      Mental Status: He is alert and oriented to person, place, and time. Deep Tendon Reflexes: Reflexes are normal and symmetric. Psychiatric:         Mood and Affect: Mood normal.         Behavior: Behavior normal.         Thought Content: Thought content normal.         Judgment: Judgment normal.           Assessment   Plan   1. Encounter for well adult exam without abnormal findings  2. Type 2 diabetes mellitus without complication, without long-term current use of insulin ([facility])  A1C 7.7%, switching to trulicty   -     Hemoglobin A1C; Future  -      DIABETES FOOT EXAM  3. Need for shingles vaccine  -     Zoster recombinant [facility])  4. Thyroid disorder screen  -     TSH; Future  5. Need for pneumococcal vaccination  -     PNEUMOVAX 23 subcutaneous/IM (Pneumococcal polysaccharide vaccine 23-valent >= 1yo)  6.  Class 1 obesity due to excess calories with serious comorbidity and body mass index (BMI) of 32.0 to 32.9 in adult  7. Hypertension, unspecified type  Controlled. 114/64     Encouraged eye exam.     Personalized Preventive Plan   Current Health Maintenance Status  Immunization History   Administered Date(s) Administered    COVID-19, [place], Primary or Immunocompromised, PF, 100mcg/0.5mL 05/07/2021, 06/04/2021, 12/21/2021    Influenza Virus Vaccine 10/24/2015, 10/21/2017, 10/22/2019, 10/09/2020, 10/13/2021    Influenza, MDCK Quadv, IM, PF (Flucelvax 2 yrs and older) 10/27/2018    Pneumococcal Polysaccharide ([id number]) 08/26/2020, 04/20/2022    Tdap (Boostrix, Adacel) 10/09/2020    Zoster Recombinant (Shingrix) 08/26/2020, 04/20/2022        Health Maintenance   Topic Date Due    HIV screen  Never done    Diabetic retinal exam  08/07/2021    Diabetic microalbuminuria test  04/13/2023    Lipids  04/13/2023    Potassium  04/13/2023    Creatinine  04/13/2023    Diabetic foot exam  04/20/2023    A1C test (Diabetic or Prediabetic)  04/20/2023    Depression Screen  04/20/2023    Pneumococcal 0-64 years Vaccine (2 - PCV) 04/20/2023    Colorectal Cancer Screen  12/29/2024    DTaP/Tdap/Td vaccine (2 - Td or Tdap) 10/09/2030    Flu vaccine  Completed    Shingles vaccine  Completed    COVID-19 Vaccine  Completed    Hepatitis C screen  Completed    Hepatitis A vaccine  Aged Out    Hib vaccine  Aged Out    Meningococcal (ACWY) vaccine  Aged Out     Recommendations for [employer] Due: see orders and patient instructions/AVS.    No follow-ups on file.

## 2022-04-20 NOTE — PROGRESS NOTES
Health Maintenance Due   Topic Date Due    HIV screen  Never done    Shingles Vaccine (2 of 2) 10/21/2020- due     Diabetic retinal exam  08/07/2021 - due     Pneumococcal 0-64 years Vaccine (2 - PCV) 08/26/2021- due     COVID-19 Vaccine (3 - Booster for [facility] series) 11/04/2021- completed    Diabetic foot exam  01/25/2022    Depression Screen  01/25/2022     Declines vaccines. After obtaining consent, and per orders of Kelle Zavaleta CNP, injection of Shingrix and [id number] given in Right deltoid by David Mcbride CMA. Patient instructed to report any adverse reaction to me immediately. Immunizations Administered     Name Date Dose Route    Pneumococcal Polysaccharide ([id number]) 4/20/2022 0.5 mL Intramuscular    Site: Deltoid- Right    Lot: [id number]    ND: 1918-2512-00    Zoster Recombinant (Shingrix) 4/20/2022 0.5 mL Intramuscular    Site: Deltoid- Right    Lot: [id number]    NDC: 00961-447-79        Patient filled out VIS checklist, ABN, and received VIS on vaccine. Patient tolerated well and denied any other questions or concerns at this time.

## 2022-04-20 NOTE — PATIENT INSTRUCTIONS
Well Visit, Men 48 to 72: Care Instructions  Overview     Well visits can help you stay healthy. Your doctor has checked your overall health and may have suggested ways to take good care of yourself. Your doctor also may have recommended tests. At home, you can help prevent illness withhealthy eating, regular exercise, and other steps. Follow-up care is a key part of your treatment and safety. Be sure to make and go to all appointments, and call your doctor if you are having problems. It's also a good idea to know your test results and keep alist of the medicines you take. How can you care for yourself at home?  Get screening tests that you and your doctor decide on. Screening helps find diseases before any symptoms appear.  Eat healthy foods. Choose fruits, vegetables, whole grains, protein, and low-fat dairy foods. Limit fat, especially saturated fat. Reduce salt in your diet.  Limit alcohol. Have no more than 2 drinks a day or 14 drinks a week.  Get at least 30 minutes of exercise on most days of the week. Walking is a good choice. You also may want to do other activities, such as running, swimming, cycling, or playing tennis or team sports.  Reach and stay at a healthy weight. This will lower your risk for many problems, such as obesity, diabetes, heart disease, and high blood pressure.  Do not smoke. Smoking can make health problems worse. If you need help quitting, talk to your doctor about stop-smoking programs and medicines. These can increase your chances of quitting for good.  Care for your mental health. It is easy to get weighed down by worry and stress. Learn strategies to manage stress, like deep breathing and mindfulness, and stay connected with your family and community. If you find you often feel sad or hopeless, talk with your doctor. Treatment can help.  Talk to your doctor about whether you have any risk factors for sexually transmitted infections (STIs).  You can help prevent STIs if you wait to have sex with a new partner (or partners) until you've each been tested for STIs. It also helps if you use condoms (male or female condoms) and if you limit your sex partners to one person who only has sex with you. Vaccines are available for some STIs.  If it's important to you to prevent pregnancy with your partner, talk with your doctor about birth control options that might be best for you.  If you think you may have a problem with alcohol or drug use, talk to your doctor. This includes prescription medicines (such as amphetamines and opioids) and illegal drugs (such as cocaine and methamphetamine). Your doctor can help you figure out what type of treatment is best for you.  Protect your skin from too much sun. When you're outdoors from 10 a.m. to 4 p.m., stay in the shade or cover up with clothing and a hat with a wide brim. Wear sunglasses that block UV rays. Even when it's cloudy, put broad-spectrum sunscreen (SPF 30 or higher) on any exposed skin.  See a dentist one or two times a year for checkups and to have your teeth cleaned.  Wear a seat belt in the car. When should you call for help? Watch closely for changes in your health, and be sure to contact your doctor if you have any problems or symptoms that concern you. Where can you learn more? Go to [email or web address] org and sign in to your [facility] account. Enter E362 in the [facility] box to learn more about \"Well Visit, Men 48 to 72: Care Instructions. \"     If you do not have an account, please click on the \"Sign Up Now\" link. Current as of: October 6, 2021               Content Version: 13.2  © 1149-3312 [employer], [employer]. Care instructions adapted under license by [facility] ([facility]).  If you have questions about a medical condition or this instruction, always ask your healthcare professional. [address]  any warranty or liability for your use of this information.

## 2022-04-21 ENCOUNTER — TELEPHONE (OUTPATIENT)
Dept: FAMILY MEDICINE CLINIC | Age: 67
End: 2022-04-21

## 2022-04-21 LAB — TSH SERPL DL<=0.05 MIU/L-ACNC: 1.91 UIU/ML (ref 0.4–4.2)

## 2022-04-21 NOTE — TELEPHONE ENCOUNTER
Patient called. States he forgot to review colonoscopy from 12/21 with Maribel Nicholson and would like to know her thoughts. Please advise. Thank you.

## 2022-04-21 NOTE — TELEPHONE ENCOUNTER
Notify patient that his colonoscopy showed multiple polyps that were removed. And it is recommended to repeat it in 3 years.

## 2022-04-26 DIAGNOSIS — E11.9 TYPE 2 DIABETES MELLITUS WITHOUT COMPLICATION, WITHOUT LONG-TERM CURRENT USE OF INSULIN (HCC): ICD-10-CM

## 2022-04-26 NOTE — TELEPHONE ENCOUNTER
Patient calling and states that he has not heard from [address] regarding his Trulicity. . called [facility]- its on hold until patient notifies them that he is ready for it to be filled. I was transfered to check on PA- [phone]. . no PA is needed if changed to 6 pens instead of 12. .  Please send in new script for patients Trulicity for only 6 pens. .     Once sent in please notify patient that he will need to call [facility] and let them know he's ready for them to fill and mail out. Cheng Zhu

## 2022-04-27 NOTE — TELEPHONE ENCOUNTER
Patient notified that he needs to call and let them know that he needs to call the mail order and have them send rx to him. Voiced understanding.

## 2022-05-04 NOTE — TELEPHONE ENCOUNTER
----- Message from Angelita Hector sent at 5/4/2022  4:36 PM EDT -----  Subject: Message to Provider    QUESTIONS  Information for Provider? pt would like to get direction on new medication   and would like contacted   ---------------------------------------------------------------------------  --------------  CALL BACK INFO  What is the best way for the office to contact you? OK to leave message on   voicemail  Preferred Call Back Phone Number? [phone]  ---------------------------------------------------------------------------  --------------  SCRIPT ANSWERS  Relationship to Patient?  Self

## 2022-05-04 NOTE — TELEPHONE ENCOUNTER
Patient just wanted to let the provider know that he got the medication and is going to start taking it either Saturday or Sunday. Denied any other questions or concerns at this time.

## 2022-07-12 LAB
AVERAGE GLUCOSE: NORMAL
HBA1C MFR BLD: 7.9 %

## 2022-07-20 ENCOUNTER — OFFICE VISIT (OUTPATIENT)
Dept: FAMILY MEDICINE CLINIC | Age: 67
End: 2022-07-20
Payer: COMMERCIAL

## 2022-07-20 ENCOUNTER — TELEPHONE (OUTPATIENT)
Dept: FAMILY MEDICINE CLINIC | Age: 67
End: 2022-07-20

## 2022-07-20 VITALS
HEART RATE: 87 BPM | HEIGHT: 68 IN | DIASTOLIC BLOOD PRESSURE: 70 MMHG | SYSTOLIC BLOOD PRESSURE: 130 MMHG | RESPIRATION RATE: 16 BRPM | BODY MASS INDEX: 32.43 KG/M2 | OXYGEN SATURATION: 97 % | WEIGHT: 214 LBS

## 2022-07-20 DIAGNOSIS — E78.5 HYPERLIPIDEMIA, UNSPECIFIED HYPERLIPIDEMIA TYPE: ICD-10-CM

## 2022-07-20 DIAGNOSIS — Z12.5 PROSTATE CANCER SCREENING: ICD-10-CM

## 2022-07-20 DIAGNOSIS — I10 HYPERTENSION, UNSPECIFIED TYPE: Primary | ICD-10-CM

## 2022-07-20 DIAGNOSIS — E11.9 TYPE 2 DIABETES MELLITUS WITHOUT COMPLICATION, WITHOUT LONG-TERM CURRENT USE OF INSULIN (HCC): ICD-10-CM

## 2022-07-20 LAB — PROSTATE SPECIFIC ANTIGEN: 0.81 NG/ML

## 2022-07-20 PROCEDURE — 99214 OFFICE O/P EST MOD 30 MIN: CPT | Performed by: NURSE PRACTITIONER

## 2022-07-20 PROCEDURE — G8417 CALC BMI ABV UP PARAM F/U: HCPCS | Performed by: NURSE PRACTITIONER

## 2022-07-20 PROCEDURE — 2022F DILAT RTA XM EVC RTNOPTHY: CPT | Performed by: NURSE PRACTITIONER

## 2022-07-20 PROCEDURE — G8427 DOCREV CUR MEDS BY ELIG CLIN: HCPCS | Performed by: NURSE PRACTITIONER

## 2022-07-20 PROCEDURE — 3017F COLORECTAL CA SCREEN DOC REV: CPT | Performed by: NURSE PRACTITIONER

## 2022-07-20 PROCEDURE — 3051F HG A1C>EQUAL 7.0%<8.0%: CPT | Performed by: NURSE PRACTITIONER

## 2022-07-20 PROCEDURE — 1036F TOBACCO NON-USER: CPT | Performed by: NURSE PRACTITIONER

## 2022-07-20 RX ORDER — IBUPROFEN 600 MG/1
600 TABLET ORAL EVERY 6 HOURS PRN
COMMUNITY

## 2022-07-20 ASSESSMENT — ENCOUNTER SYMPTOMS
SORE THROAT: 0
ABDOMINAL DISTENTION: 0
DIARRHEA: 0
WHEEZING: 0
COLOR CHANGE: 0
TROUBLE SWALLOWING: 0
VOMITING: 0
COUGH: 0
NAUSEA: 0
CONSTIPATION: 0
EYE PAIN: 0
EYE REDNESS: 0
SINUS PRESSURE: 0
BACK PAIN: 0
ABDOMINAL PAIN: 0
CHEST TIGHTNESS: 0
EYE DISCHARGE: 0
SHORTNESS OF BREATH: 0
EYE ITCHING: 0
BLOOD IN STOOL: 0
RECTAL PAIN: 0
SINUS PAIN: 0
FACIAL SWELLING: 0

## 2022-07-20 NOTE — PROGRESS NOTES
[address] [address]  Dept: [phone]  Loc: [phone]    Ksenia Andrews (:  1955) is a 61 y.o. male,Established patient, here for evaluation of the following chief complaint(s):  Diabetes (3 month follow up )      ASSESSMENT/PLAN:  1. Hypertension, unspecified type  Controlled. 130/70  2. Type 2 diabetes mellitus without complication, without long-term current use of insulin ([facility])  A1C 6.8% increasing Trulicity to 1.5 mg weekly   -     Dulaglutide 1.5 MG/0.5ML SOPN; Inject 1.5 mg into the skin once a week, Disp-12 pen, R-1Normal  3. Hyperlipidemia, unspecified hyperlipidemia type  Stable. Patient has that he is taking 20 bid of simvastatin, we have 20 daily, he will call and verify his pill bottle. 4. Prostate cancer screening  -     PSA Prostatic Specific Antigen; Future    Return in about 3 months (around 10/20/2022) for DM f/u 7.8% . SUBJECTIVE/OBJECTIVE:  HPI     has a past medical history of History of kidney stones, Hyperlipidemia, Hypertension, Type 2 diabetes mellitus ([address] and Type II or unspecified type diabetes mellitus without mention of complication, not stated as uncontrolled. Review of Systems   Constitutional:  Negative for activity change, appetite change, fatigue, fever and unexpected weight change. HENT:  Negative for congestion, dental problem, ear pain, facial swelling, mouth sores, postnasal drip, sinus pressure, sinus pain, sore throat and trouble swallowing. Eyes:  Negative for pain, discharge, redness, itching and visual disturbance. Respiratory:  Negative for cough, chest tightness, shortness of breath and wheezing. Cardiovascular:  Negative for chest pain, palpitations and leg swelling. Gastrointestinal:  Negative for abdominal distention, abdominal pain, blood in stool, constipation, diarrhea, nausea, rectal pain and vomiting.    Endocrine: Negative for cold intolerance and heat intolerance. Genitourinary:  Negative for difficulty urinating, dysuria, frequency, hematuria, penile pain, scrotal swelling, testicular pain and urgency. Musculoskeletal:  Negative for arthralgias, back pain, gait problem and neck pain. Skin:  Negative for color change, rash and wound. Neurological:  Negative for dizziness, seizures, weakness, light-headedness, numbness and headaches. Psychiatric/Behavioral:  Negative for agitation and sleep disturbance. Physical Exam  Vitals and nursing note reviewed. Constitutional:       General: He is not in acute distress. Appearance: Normal appearance. He is well-developed. He is not diaphoretic. HENT:      Head: Normocephalic and atraumatic. Right Ear: Tympanic membrane and external ear normal. Tympanic membrane is not injected or erythematous. Left Ear: Tympanic membrane and external ear normal. Tympanic membrane is not injected or erythematous. Nose: Nose normal.      Mouth/Throat:      Mouth: Mucous membranes are moist.      Pharynx: Oropharynx is clear. Uvula midline. Eyes:      General:         Right eye: No discharge. Left eye: No discharge. Conjunctiva/sclera: Conjunctivae normal.      Pupils: Pupils are equal, round, and reactive to light. Neck:      Thyroid: No thyromegaly. Trachea: Trachea normal.   Cardiovascular:      Rate and Rhythm: Normal rate and regular rhythm. Pulses: Normal pulses. Heart sounds: Normal heart sounds, S1 normal and S2 normal. No murmur heard. No friction rub. No gallop. Pulmonary:      Effort: Pulmonary effort is normal. No respiratory distress. Breath sounds: Normal breath sounds. No wheezing or rales. Chest:      Chest wall: No tenderness. Abdominal:      General: Bowel sounds are normal. There is no distension. Palpations: Abdomen is soft. There is no mass. Tenderness: There is no abdominal tenderness.  There is no guarding or rebound. Musculoskeletal:         General: No tenderness or deformity. Normal range of motion. Cervical back: Full passive range of motion without pain, normal range of motion and neck supple. Lymphadenopathy:      Cervical: No cervical adenopathy. Skin:     General: Skin is warm and dry. Capillary Refill: Capillary refill takes less than 2 seconds. Neurological:      General: No focal deficit present. Mental Status: He is alert and oriented to person, place, and time. Deep Tendon Reflexes: Reflexes are normal and symmetric. An electronic signature was used to authenticate this note.     --HALIMA Harper - CNP

## 2022-07-22 ENCOUNTER — TELEPHONE (OUTPATIENT)
Dept: FAMILY MEDICINE CLINIC | Age: 67
End: 2022-07-22

## 2022-07-22 NOTE — TELEPHONE ENCOUNTER
----- Message from HALIMA Spangler CNP sent at 7/20/2022  8:32 AM EDT -----  Please call [facility] pharmacy and ask the pharmacist if it is allowed to have a patient give himself 2 injections of Trulicity 4.79 mg to avoid wasting his current supply. I am wanting to increase him to 1.5 mg weekly, he has 12 pens of 0.75 mg he does not want to waste.    Thanks so much

## 2022-07-25 NOTE — TELEPHONE ENCOUNTER
Per pharmacist at [facility]- yes he is able to give himself  2 injections of Trulicity 4.10 mg to avoid wasting his current supply. Do you want me notify patient to take two injections once weekly ?

## 2022-10-03 LAB
AVERAGE GLUCOSE: NORMAL
HBA1C MFR BLD: 8.5 %

## 2022-10-19 ENCOUNTER — OFFICE VISIT (OUTPATIENT)
Dept: FAMILY MEDICINE CLINIC | Age: 67
End: 2022-10-19
Payer: COMMERCIAL

## 2022-10-19 VITALS
DIASTOLIC BLOOD PRESSURE: 70 MMHG | HEIGHT: 68 IN | WEIGHT: 218 LBS | HEART RATE: 75 BPM | SYSTOLIC BLOOD PRESSURE: 138 MMHG | BODY MASS INDEX: 33.04 KG/M2 | OXYGEN SATURATION: 98 % | RESPIRATION RATE: 16 BRPM

## 2022-10-19 DIAGNOSIS — E78.5 HYPERLIPIDEMIA, UNSPECIFIED HYPERLIPIDEMIA TYPE: ICD-10-CM

## 2022-10-19 DIAGNOSIS — E11.9 TYPE 2 DIABETES MELLITUS WITHOUT COMPLICATION, WITHOUT LONG-TERM CURRENT USE OF INSULIN (HCC): Primary | ICD-10-CM

## 2022-10-19 DIAGNOSIS — E66.09 CLASS 1 OBESITY DUE TO EXCESS CALORIES WITH SERIOUS COMORBIDITY AND BODY MASS INDEX (BMI) OF 32.0 TO 32.9 IN ADULT: ICD-10-CM

## 2022-10-19 DIAGNOSIS — I10 HYPERTENSION, UNSPECIFIED TYPE: ICD-10-CM

## 2022-10-19 LAB — HBA1C MFR BLD: 8 %

## 2022-10-19 PROCEDURE — G8417 CALC BMI ABV UP PARAM F/U: HCPCS | Performed by: NURSE PRACTITIONER

## 2022-10-19 PROCEDURE — 99214 OFFICE O/P EST MOD 30 MIN: CPT | Performed by: NURSE PRACTITIONER

## 2022-10-19 PROCEDURE — 3017F COLORECTAL CA SCREEN DOC REV: CPT | Performed by: NURSE PRACTITIONER

## 2022-10-19 PROCEDURE — 3052F HG A1C>EQUAL 8.0%<EQUAL 9.0%: CPT | Performed by: NURSE PRACTITIONER

## 2022-10-19 PROCEDURE — G8484 FLU IMMUNIZE NO ADMIN: HCPCS | Performed by: NURSE PRACTITIONER

## 2022-10-19 PROCEDURE — 1036F TOBACCO NON-USER: CPT | Performed by: NURSE PRACTITIONER

## 2022-10-19 PROCEDURE — G8427 DOCREV CUR MEDS BY ELIG CLIN: HCPCS | Performed by: NURSE PRACTITIONER

## 2022-10-19 PROCEDURE — 2022F DILAT RTA XM EVC RTNOPTHY: CPT | Performed by: NURSE PRACTITIONER

## 2022-10-19 PROCEDURE — 83036 HEMOGLOBIN GLYCOSYLATED A1C: CPT | Performed by: NURSE PRACTITIONER

## 2022-10-19 RX ORDER — DULAGLUTIDE 1.5 MG/.5ML
1.5 INJECTION, SOLUTION SUBCUTANEOUS WEEKLY
COMMUNITY
End: 2022-10-19

## 2022-10-19 RX ORDER — DULAGLUTIDE 3 MG/.5ML
3 INJECTION, SOLUTION SUBCUTANEOUS WEEKLY
Qty: 12 ADJUSTABLE DOSE PRE-FILLED PEN SYRINGE | Refills: 3 | Status: SHIPPED | OUTPATIENT
Start: 2022-10-19

## 2022-10-19 ASSESSMENT — ENCOUNTER SYMPTOMS
COLOR CHANGE: 0
EYE REDNESS: 0
RECTAL PAIN: 0
CONSTIPATION: 0
SORE THROAT: 0
BACK PAIN: 0
DIARRHEA: 0
WHEEZING: 0
ABDOMINAL PAIN: 0
FACIAL SWELLING: 0
BLOOD IN STOOL: 0
EYE ITCHING: 0
NAUSEA: 0
SHORTNESS OF BREATH: 0
SINUS PRESSURE: 0
EYE PAIN: 0
COUGH: 0
VOMITING: 0
TROUBLE SWALLOWING: 0
SINUS PAIN: 0
CHEST TIGHTNESS: 0
EYE DISCHARGE: 0
ABDOMINAL DISTENTION: 0

## 2022-10-19 NOTE — PROGRESS NOTES
[address] [address]  Dept: [phone]  Loc: [phone]    Kaur Benites (:  1955) is a 61 y.o. male,Established patient, here for evaluation of the following chief complaint(s):  Diabetes (3 month follow up ) and Medication Refill Anneldashawn Holly- would like the 5 mg instead of cutting in half )      ASSESSMENT/PLAN:  1. Type 2 diabetes mellitus without complication, without long-term current use of insulin ([facility])  A1c today was 8%, I did recheck this in the office because the lab that was done through [facility] showed that it was 8.5% and I was questioning the high increase. Increasing Trulicity from 1.5 mg weekly to 3 mg weekly, new prescription was sent in. Patient is going to give himself 2 of the 1.5 mg injections weekly to equal 3 until he receives his new prescription from mail order. Diabetic foot exam completed and unremarkable. 3/67 Trulicity was sent in   0.75   JUly 7.9 from 7.7  Increased 1.5  -     POCT glycosylated hemoglobin (Hb A1C)  -     Dulaglutide (TRULICITY) 3 GK/6.7EQ SOPN; Inject 3 mg into the skin once a week, Disp-12 Adjustable Dose Pre-filled Pen Syringe, R-3Normal  -     dapagliflozin (FARXIGA) 5 MG tablet; Take 1 tablet by mouth 2 times daily, Disp-180 tablet, R-3Normal  -     HM DIABETES FOOT EXAM  2. Hypertension, unspecified type  Controlled. 138/70. Continue losartan 50 mg daily  Patient reminded and encouraged to make the necessary lifestyle modifications if appropriate: Weight loss, DASH diet, Reduce sodium, Increase physical activity, Moderate Alcohol consumption. 3. Hyperlipidemia, unspecified hyperlipidemia type  Controlled continue simvastatin 20 mg nightly.   Lab Results   Component Value Date    CHOL 157 2022    CHOL 106 2020    CHOL 138 2019     Lab Results   Component Value Date    TRIG 177 2022    TRIG 172 2020    TRIG 128 2019 Lab Results   Component Value Date    HDL 39 04/13/2022    HDL 43 03/19/2021    HDL 53 07/17/2020     Lab Results   Component Value Date    LDLCALC 91 04/13/2022    LDLCALC 84 03/19/2021    LDLCALC 79 07/17/2020     Lab Results   Component Value Date    VLDL 34 07/17/2020    VLDL 99 04/11/2019     Lab Results   Component Value Date    CHOLHDLRATIO 4.0 04/13/2022    CHOLHDLRATIO 3.0 07/17/2020    CHOLHDLRATIO 3.5 04/11/2019     4. Class 1 obesity due to excess calories with serious comorbidity and body mass index (BMI) of 32.0 to 32.9 in adult  Encourage patient to follow a heart healthy low-fat low sugar diet. Exercise 30 minutes daily. According to the patient's scale at home he states he was to 211 pounds this morning and states he has lost weight since starting the Trulicity. Patient declines COVID and flu vaccine. Return in about 3 months (around 1/19/2023) for DM f/u A1C 8.0%. SUBJECTIVE/OBJECTIVE:  Patient presents with:  Diabetes: 3 month follow up   Medication Refill: [place]- would like the 5 mg instead of cutting in half       8/51 Trulicity was sent in 1.36 mg weekly dose,   JUly A1C 7.9 from 7.7%  Increased 1.5mg   So he used his 0.75 gave double dose.              has a past medical history of History of kidney stones, Hyperlipidemia, Hypertension, Type 2 diabetes mellitus ([address] and Type II or unspecified type diabetes mellitus without mention of complication, not stated as uncontrolled. Review of Systems   Constitutional:  Negative for activity change, appetite change, fatigue, fever and unexpected weight change. HENT:  Negative for congestion, dental problem, ear pain, facial swelling, mouth sores, postnasal drip, sinus pressure, sinus pain, sore throat and trouble swallowing. Eyes:  Negative for pain, discharge, redness, itching and visual disturbance. Respiratory:  Negative for cough, chest tightness, shortness of breath and wheezing.     Cardiovascular:  Negative for chest pain, palpitations and leg swelling. Gastrointestinal:  Negative for abdominal distention, abdominal pain, blood in stool, constipation, diarrhea, nausea, rectal pain and vomiting. Endocrine: Negative for cold intolerance and heat intolerance. Genitourinary:  Negative for difficulty urinating, dysuria, frequency, hematuria, penile pain, scrotal swelling, testicular pain and urgency. Musculoskeletal:  Negative for arthralgias, back pain, gait problem and neck pain. Skin:  Negative for color change, rash and wound. Neurological:  Negative for dizziness, seizures, weakness, light-headedness, numbness and headaches. Psychiatric/Behavioral:  Negative for agitation and sleep disturbance. Physical Exam  Vitals and nursing note reviewed. Constitutional:       General: He is not in acute distress. Appearance: Normal appearance. He is well-developed. He is not ill-appearing or diaphoretic. HENT:      Head: Normocephalic and atraumatic. Right Ear: Tympanic membrane and external ear normal. Tympanic membrane is not injected or erythematous. Left Ear: Tympanic membrane and external ear normal. Tympanic membrane is not injected or erythematous. Nose: Nose normal.      Mouth/Throat:      Mouth: Mucous membranes are moist.      Pharynx: Oropharynx is clear. Uvula midline. Eyes:      General:         Right eye: No discharge. Left eye: No discharge. Conjunctiva/sclera: Conjunctivae normal.      Pupils: Pupils are equal, round, and reactive to light. Neck:      Thyroid: No thyromegaly. Trachea: Trachea normal.   Cardiovascular:      Rate and Rhythm: Normal rate and regular rhythm. Pulses: Normal pulses. Dorsalis pedis pulses are 2+ on the right side and 2+ on the left side. Posterior tibial pulses are 2+ on the right side and 2+ on the left side. Heart sounds: Normal heart sounds, S1 normal and S2 normal. No murmur heard. No friction rub.  No gallop. Pulmonary:      Effort: Pulmonary effort is normal. No respiratory distress. Breath sounds: Normal breath sounds. No wheezing or rales. Chest:      Chest wall: No tenderness. Abdominal:      General: Bowel sounds are normal. There is no distension. Palpations: Abdomen is soft. There is no mass. Tenderness: There is no abdominal tenderness. There is no guarding or rebound. Musculoskeletal:         General: No tenderness or deformity. Normal range of motion. Cervical back: Full passive range of motion without pain, normal range of motion and neck supple. Feet:      Right foot:      Protective Sensation: 6 sites tested. 6 sites sensed. Skin integrity: Skin integrity normal.      Toenail Condition: Right toenails are normal.      Left foot:      Protective Sensation: 6 sites tested. 6 sites sensed. Skin integrity: Skin integrity normal.      Toenail Condition: Left toenails are normal.   Lymphadenopathy:      Cervical: No cervical adenopathy. Skin:     General: Skin is warm and dry. Capillary Refill: Capillary refill takes less than 2 seconds. Neurological:      Mental Status: He is alert and oriented to person, place, and time. Deep Tendon Reflexes: Reflexes are normal and symmetric. Psychiatric:         Mood and Affect: Mood normal.         Behavior: Behavior normal.           An electronic signature was used to authenticate this note.     --Len Ramírez, APRN - CNP

## 2022-10-27 ENCOUNTER — OFFICE VISIT (OUTPATIENT)
Dept: FAMILY MEDICINE CLINIC | Age: 67
End: 2022-10-27
Payer: COMMERCIAL

## 2022-10-27 VITALS
BODY MASS INDEX: 32.13 KG/M2 | DIASTOLIC BLOOD PRESSURE: 74 MMHG | RESPIRATION RATE: 16 BRPM | SYSTOLIC BLOOD PRESSURE: 130 MMHG | HEART RATE: 82 BPM | OXYGEN SATURATION: 98 % | HEIGHT: 68 IN | WEIGHT: 212 LBS

## 2022-10-27 DIAGNOSIS — M79.89 PAIN AND SWELLING OF TOE OF RIGHT FOOT: Primary | ICD-10-CM

## 2022-10-27 DIAGNOSIS — M79.674 PAIN AND SWELLING OF TOE OF RIGHT FOOT: Primary | ICD-10-CM

## 2022-10-27 DIAGNOSIS — T14.8XXA CRUSHING INJURY: ICD-10-CM

## 2022-10-27 PROCEDURE — 3017F COLORECTAL CA SCREEN DOC REV: CPT | Performed by: STUDENT IN AN ORGANIZED HEALTH CARE EDUCATION/TRAINING PROGRAM

## 2022-10-27 PROCEDURE — 99213 OFFICE O/P EST LOW 20 MIN: CPT | Performed by: STUDENT IN AN ORGANIZED HEALTH CARE EDUCATION/TRAINING PROGRAM

## 2022-10-27 PROCEDURE — 3078F DIAST BP <80 MM HG: CPT | Performed by: STUDENT IN AN ORGANIZED HEALTH CARE EDUCATION/TRAINING PROGRAM

## 2022-10-27 PROCEDURE — G8417 CALC BMI ABV UP PARAM F/U: HCPCS | Performed by: STUDENT IN AN ORGANIZED HEALTH CARE EDUCATION/TRAINING PROGRAM

## 2022-10-27 PROCEDURE — 1036F TOBACCO NON-USER: CPT | Performed by: STUDENT IN AN ORGANIZED HEALTH CARE EDUCATION/TRAINING PROGRAM

## 2022-10-27 PROCEDURE — 3074F SYST BP LT 130 MM HG: CPT | Performed by: STUDENT IN AN ORGANIZED HEALTH CARE EDUCATION/TRAINING PROGRAM

## 2022-10-27 PROCEDURE — G8484 FLU IMMUNIZE NO ADMIN: HCPCS | Performed by: STUDENT IN AN ORGANIZED HEALTH CARE EDUCATION/TRAINING PROGRAM

## 2022-10-27 PROCEDURE — G8427 DOCREV CUR MEDS BY ELIG CLIN: HCPCS | Performed by: STUDENT IN AN ORGANIZED HEALTH CARE EDUCATION/TRAINING PROGRAM

## 2022-10-27 ASSESSMENT — ENCOUNTER SYMPTOMS: COLOR CHANGE: 1

## 2022-10-27 NOTE — PROGRESS NOTES
[address] [address]  Dept: [phone]  Dept Fax: [phone]  Loc: [phone]    Russ Henley is a 61 y.o. male who presents today for his medical conditions/complaints as noted below. Chief Complaint   Patient presents with    Other     Pt states that he dropped a part of a walk ramp on his feet, complaining of bruising, swelling, and drainage        HPI:     Patient presents to the office today for concerns of bruising, swelling, and pain of his right middle toe x2 days. States that 2 days ago, he smashed his foot/toe under a walking ramp and immediately had pain, followed by bruising and swelling. States that he is able to move his toe normally and does not have any numbness, tingling, or weakness. Patient is most worried about blood flow to that region because he is diabetic. States that he feels like he needs to have that nail removed because of swelling and bruising under and around it.       Past Medical History:   Diagnosis Date    History of kidney stones     Hyperlipidemia     Hypertension     Type 2 diabetes mellitus (HCC)     Type II or unspecified type diabetes mellitus without mention of complication, not stated as uncontrolled       Past Surgical History:   Procedure Laterality Date    CERVICAL FUSION      4,5,6,7, and placed plate and screws    EYE SURGERY      FINGER AMPUTATION Right     2nd and 4th diget    HERNIA REPAIR      VASECTOMY         Family History   Problem Relation Age of Onset    Arthritis Mother     Diabetes Mother     Miscarriages / Johnnie Avilez Mother     Stroke Mother     Stroke Father     Arthritis Sister     Diabetes Sister     Arthritis Brother     Cancer Brother     Early Death Brother     Miscarriages / Johnnie Avilez Daughter        Social History     Tobacco Use    Smoking status: Former     Packs/day: 0.25     Years: 2.00     Pack years: 0.50     Types: Cigarettes Quit date: 1979     Years since quittin.8    Smokeless tobacco: Never   Substance Use Topics    Alcohol use: Never      Current Outpatient Medications   Medication Sig Dispense Refill    Dulaglutide (TRULICITY) 3 FL/9.6TE SOPN Inject 3 mg into the skin once a week 12 Adjustable Dose Pre-filled Pen Syringe 3    dapagliflozin (FARXIGA) 5 MG tablet Take 1 tablet by mouth 2 times daily 180 tablet 3    ibuprofen (ADVIL;MOTRIN) 600 MG tablet Take 600 mg by mouth every 6 hours as needed for Pain      simvastatin (ZOCOR) 20 MG tablet Take 1 tablet by mouth nightly 90 tablet 3    Insulin Pen Needle 31G X 5 MM MISC 1 each by Does not apply route daily 100 each 3    metFORMIN (GLUCOPHAGE) 1000 MG tablet Take 1 tablet by mouth 2 times daily (with meals) 180 tablet 3    losartan (COZAAR) 50 MG tablet 1 (one) time each day at the same time. Take one tablet by mouth once daily 90 tablet 3    glimepiride (AMARYL) 2 MG tablet Take 1 tablet daily 90 tablet 3    cetirizine (ZYRTEC) 10 MG tablet Take 1 tablet by mouth daily 90 tablet 3    blood glucose test strips (EXACTECH TEST) strip 1 each by In Vitro route 4 times daily as needed (diabetes) As needed. 400 each 3    Insulin Pen Needle 31G X 5 MM MISC 1 each by Does not apply route 2 times daily 200 each 3     No current facility-administered medications for this visit.      Allergies   Allergen Reactions    Sulfa Antibiotics        Health Maintenance   Topic Date Due    HIV screen  Never done    COVID-19 Vaccine (4 - Booster for Moderna series) 02/15/2022    Flu vaccine (1) 2022    Diabetic microalbuminuria test  2023    Depression Screen  2023    Pneumococcal 0-64 years Vaccine (2 - PCV) 2023    Lipids  2023    Diabetic retinal exam  2023    Diabetic foot exam  10/19/2023    A1C test (Diabetic or Prediabetic)  10/19/2023    Colorectal Cancer Screen  2024    DTaP/Tdap/Td vaccine (2 - Td or Tdap) 10/09/2030    Shingles vaccine Completed    Hepatitis C screen  Completed    Hepatitis A vaccine  Aged Out    Hib vaccine  Aged Out    Meningococcal (ACWY) vaccine  Aged Out       Subjective:      Review of Systems   Constitutional:  Negative for chills and fever. Musculoskeletal:  Positive for gait problem (hurts to walk on right foot/toes). + right middle toe pain (distal pain around area of toenail)   Skin:  Positive for color change (bruising and redness of right middle distal toe) and wound. Neurological:  Negative for weakness and numbness. Objective:     Physical Exam  Vitals and nursing note reviewed. Constitutional:       General: He is not in acute distress. Appearance: Normal appearance. He is well-developed. He is obese. He is not ill-appearing or toxic-appearing. HENT:      Head: Normocephalic and atraumatic. Cardiovascular:      Pulses:           Posterior tibial pulses are 2+ on the right side. Pulmonary:      Effort: Pulmonary effort is normal.   Feet:      Comments: Obvious ecchymosis, hematoma, and swelling of distal phalanx of right middle toe; tenderness of area with palpation; nail is thickened and discolored; patient able to actively flex right middle toe  Skin:     General: Skin is warm and dry. Neurological:      General: No focal deficit present. Mental Status: He is alert. Psychiatric:         Mood and Affect: Mood and affect normal.         Behavior: Behavior is cooperative. /74 (Site: Left Upper Arm, Position: Sitting, Cuff Size: Medium Adult)   Pulse 82   Resp 16   Ht 5' 7.5\" (1.715 m)   Wt 212 lb (96.2 kg)   SpO2 98%   BMI 32.71 kg/m²     Assessment/Plan:   Arlington Cockayne was seen today for other. Diagnoses and all orders for this visit:    Pain and swelling of toe of right foot  -     XR FOOT RIGHT (MIN 3 VIEWS); Future    Crushing injury  -     XR FOOT RIGHT (MIN 3 VIEWS);  Future    65-year-old diabetic male presenting to the office with a crush injury to his right foot, associated with subsequent pain, swelling, and bruising of his right middle distal toe x2 days. Physical exam is significant for obvious ecchymosis, hematoma, and swelling of right middle toe. X-ray in the office shows evidence of comminuted fracture of third digit distal phalanx. Since patient may have an overlying nail injury with comminuted fracture, I discussed referring him to be seen by orthopedic walk-in clinic this morning. Patient agreeable with plan of care and plans to go to [facility] walk-in clinic for evaluation/management.     Electronically signed by Tatyana Garza DO on 10/27/2022 at 11:42 AM

## 2022-11-10 DIAGNOSIS — E11.9 TYPE 2 DIABETES MELLITUS WITHOUT COMPLICATION, WITHOUT LONG-TERM CURRENT USE OF INSULIN (HCC): ICD-10-CM

## 2022-11-10 NOTE — PROGRESS NOTES
Patient stopped in reports that he has not been able to get his Catie Imam that the mail order pharmacy was waiting on a correspondence from us. Prescription was resent patient was called and notified. He is to take 10 mg once a day. At some point he on his own was splitting that in half and taking 5 twice a day. But he was advised to take 10 mg once a day.   Patient  voiced understanding

## 2023-01-06 DIAGNOSIS — E11.9 TYPE 2 DIABETES MELLITUS WITHOUT COMPLICATION, WITHOUT LONG-TERM CURRENT USE OF INSULIN (HCC): ICD-10-CM

## 2023-01-08 RX ORDER — BLOOD SUGAR DIAGNOSTIC
1 STRIP MISCELLANEOUS 4 TIMES DAILY PRN
Qty: 400 EACH | Refills: 3 | Status: SHIPPED | OUTPATIENT
Start: 2023-01-08

## 2023-01-16 LAB
AVERAGE GLUCOSE: ABNORMAL
HBA1C MFR BLD: 7.2 %

## 2023-01-19 ENCOUNTER — OFFICE VISIT (OUTPATIENT)
Dept: FAMILY MEDICINE CLINIC | Age: 68
End: 2023-01-19
Payer: MEDICARE

## 2023-01-19 VITALS
WEIGHT: 212 LBS | DIASTOLIC BLOOD PRESSURE: 80 MMHG | SYSTOLIC BLOOD PRESSURE: 138 MMHG | RESPIRATION RATE: 16 BRPM | OXYGEN SATURATION: 98 % | HEART RATE: 63 BPM | HEIGHT: 68 IN | BODY MASS INDEX: 32.13 KG/M2

## 2023-01-19 DIAGNOSIS — M79.89 PAIN AND SWELLING OF TOE OF RIGHT FOOT: ICD-10-CM

## 2023-01-19 DIAGNOSIS — E11.9 TYPE 2 DIABETES MELLITUS WITHOUT COMPLICATION, WITHOUT LONG-TERM CURRENT USE OF INSULIN (HCC): ICD-10-CM

## 2023-01-19 DIAGNOSIS — M79.674 PAIN AND SWELLING OF TOE OF RIGHT FOOT: ICD-10-CM

## 2023-01-19 DIAGNOSIS — Z13.6 SCREENING FOR AAA (AORTIC ABDOMINAL ANEURYSM): Primary | ICD-10-CM

## 2023-01-19 DIAGNOSIS — E78.5 HYPERLIPIDEMIA, UNSPECIFIED HYPERLIPIDEMIA TYPE: ICD-10-CM

## 2023-01-19 DIAGNOSIS — J30.2 SEASONAL ALLERGIES: ICD-10-CM

## 2023-01-19 DIAGNOSIS — I10 HYPERTENSION, UNSPECIFIED TYPE: ICD-10-CM

## 2023-01-19 PROCEDURE — G8427 DOCREV CUR MEDS BY ELIG CLIN: HCPCS | Performed by: NURSE PRACTITIONER

## 2023-01-19 PROCEDURE — 3078F DIAST BP <80 MM HG: CPT | Performed by: NURSE PRACTITIONER

## 2023-01-19 PROCEDURE — 1123F ACP DISCUSS/DSCN MKR DOCD: CPT | Performed by: NURSE PRACTITIONER

## 2023-01-19 PROCEDURE — G8417 CALC BMI ABV UP PARAM F/U: HCPCS | Performed by: NURSE PRACTITIONER

## 2023-01-19 PROCEDURE — 1036F TOBACCO NON-USER: CPT | Performed by: NURSE PRACTITIONER

## 2023-01-19 PROCEDURE — 3074F SYST BP LT 130 MM HG: CPT | Performed by: NURSE PRACTITIONER

## 2023-01-19 PROCEDURE — 3017F COLORECTAL CA SCREEN DOC REV: CPT | Performed by: NURSE PRACTITIONER

## 2023-01-19 PROCEDURE — 3051F HG A1C>EQUAL 7.0%<8.0%: CPT | Performed by: NURSE PRACTITIONER

## 2023-01-19 PROCEDURE — 99214 OFFICE O/P EST MOD 30 MIN: CPT | Performed by: NURSE PRACTITIONER

## 2023-01-19 PROCEDURE — G8482 FLU IMMUNIZE ORDER/ADMIN: HCPCS | Performed by: NURSE PRACTITIONER

## 2023-01-19 PROCEDURE — 2022F DILAT RTA XM EVC RTNOPTHY: CPT | Performed by: NURSE PRACTITIONER

## 2023-01-19 RX ORDER — IBUPROFEN 600 MG/1
600 TABLET ORAL EVERY 6 HOURS PRN
Qty: 120 TABLET | Refills: 1 | Status: SHIPPED | OUTPATIENT
Start: 2023-01-19

## 2023-01-19 RX ORDER — LOSARTAN POTASSIUM 50 MG/1
TABLET ORAL
Qty: 90 TABLET | Refills: 3 | Status: SHIPPED | OUTPATIENT
Start: 2023-01-19

## 2023-01-19 RX ORDER — GLIMEPIRIDE 2 MG/1
TABLET ORAL
Qty: 90 TABLET | Refills: 3 | Status: CANCELLED | OUTPATIENT
Start: 2023-01-19

## 2023-01-19 RX ORDER — CETIRIZINE HYDROCHLORIDE 10 MG/1
10 TABLET ORAL DAILY
Qty: 90 TABLET | Refills: 3 | Status: SHIPPED | OUTPATIENT
Start: 2023-01-19

## 2023-01-19 RX ORDER — GLUCOSAMINE HCL/CHONDROITIN SU 500-400 MG
CAPSULE ORAL
Qty: 400 STRIP | Refills: 3 | Status: SHIPPED | OUTPATIENT
Start: 2023-01-19

## 2023-01-19 RX ORDER — SIMVASTATIN 20 MG
20 TABLET ORAL NIGHTLY
Qty: 90 TABLET | Refills: 3 | Status: SHIPPED | OUTPATIENT
Start: 2023-01-19 | End: 2023-04-19

## 2023-01-19 ASSESSMENT — ENCOUNTER SYMPTOMS
EYE ITCHING: 0
ABDOMINAL PAIN: 0
SINUS PAIN: 0
NAUSEA: 0
CONSTIPATION: 0
SORE THROAT: 0
ABDOMINAL DISTENTION: 0
VOMITING: 0
RECTAL PAIN: 0
TROUBLE SWALLOWING: 0
SHORTNESS OF BREATH: 0
EYE DISCHARGE: 0
SINUS PRESSURE: 0
FACIAL SWELLING: 0
CHEST TIGHTNESS: 0
BACK PAIN: 0
COUGH: 0
EYE REDNESS: 0
WHEEZING: 0
DIARRHEA: 0
COLOR CHANGE: 0
EYE PAIN: 0
BLOOD IN STOOL: 0

## 2023-01-19 NOTE — PROGRESS NOTES
[address] [address]  Dept: [phone]  Loc: [phone]    Consuelo Pathak (:  1955) is a 74 y.o. male,Established patient, here for evaluation of the following chief complaint(s):  Diabetes (3 month follow up ) and Medication Refill (Accucheck smartview test strips )      ASSESSMENT/PLAN:  1. Screening for AAA (aortic abdominal aneurysm)  -     US SCREENING FOR AAA; Future  2. Type 2 diabetes mellitus without complication, without long-term current use of insulin (HCC)  Controlled. A1C 7.2%  -     US SCREENING FOR AAA; Future  -     metFORMIN (GLUCOPHAGE) 1000 MG tablet; Take 1 tablet by mouth 2 times daily (with meals), Disp-180 tablet, R-3Print  -     blood glucose monitor strips; Test 4 times a day & as needed for symptoms of irregular blood glucose. Dispense sufficient amount for indicated testing frequency plus additional to accommodate PRN testing needs. , Disp-400 strip, R-3, Normal  3. Hypertension, unspecified type  Controlled. 138/80  Patient reminded and encouraged to make the necessary lifestyle modifications if appropriate: Weight loss, DASH diet, Reduce sodium, Increase physical activity, Moderate Alcohol consumption.   -     losartan (COZAAR) 50 MG tablet; 1 (one) time each day at the same time. Take one tablet by mouth once daily, Disp-90 tablet, R-3Print  -     US SCREENING FOR AAA; Future  4. Hyperlipidemia, unspecified hyperlipidemia type  Controlled. -     simvastatin (ZOCOR) 20 MG tablet; Take 1 tablet by mouth nightly, Disp-90 tablet, R-3Print  -     Lipid, Fasting; Future  5. Seasonal allergies  -     cetirizine (ZYRTEC) 10 MG tablet; Take 1 tablet by mouth daily, Disp-90 tablet, R-3Print  6. Pain and swelling of toe of right foot  Not acute, exam unremarkable. To follow with podiatry  -     ibuprofen (ADVIL;MOTRIN) 600 MG tablet;  Take 1 tablet by mouth every 6 hours as needed for Pain, Disp-120 tablet, R-1Print    A1C 7.2%   At [facility] visit   Increasing Trulicity from 1.5 mg weekly to 3 mg weekly, new prescription was sent in. Patient is going to give himself 2 of the 1.5 mg injections weekly to equal 3 until he receives his new prescription from mail order. Diabetic foot exam completed and unremarkable. Return in about 3 months (around 4/19/2023) for Medicare AWV and DM f/u . SUBJECTIVE/OBJECTIVE:  Following up for chronic conditions. has a past medical history of History of kidney stones, Hyperlipidemia, Hypertension, Type 2 diabetes mellitus ([address] and Type II or unspecified type diabetes mellitus without mention of complication, not stated as uncontrolled. Fasting on average 105-106           has a past medical history of History of kidney stones, Hyperlipidemia, Hypertension, Type 2 diabetes mellitus ([address] and Type II or unspecified type diabetes mellitus without mention of complication, not stated as uncontrolled. Review of Systems   Constitutional:  Negative for activity change, appetite change, fatigue, fever and unexpected weight change. HENT:  Negative for congestion, dental problem, ear pain, facial swelling, mouth sores, postnasal drip, sinus pressure, sinus pain, sore throat and trouble swallowing. Eyes:  Negative for pain, discharge, redness, itching and visual disturbance. Respiratory:  Negative for cough, chest tightness, shortness of breath and wheezing. Cardiovascular:  Negative for chest pain, palpitations and leg swelling. Gastrointestinal:  Negative for abdominal distention, abdominal pain, blood in stool, constipation, diarrhea, nausea, rectal pain and vomiting. Endocrine: Negative for cold intolerance and heat intolerance. Genitourinary:  Negative for difficulty urinating, dysuria, frequency, hematuria, penile pain, scrotal swelling, testicular pain and urgency.    Musculoskeletal:  Negative for arthralgias, back pain, gait problem and neck pain. Skin:  Negative for color change, rash and wound. Neurological:  Negative for dizziness, seizures, weakness, light-headedness, numbness and headaches. Psychiatric/Behavioral:  Negative for agitation and sleep disturbance. Physical Exam  Vitals and nursing note reviewed. Constitutional:       General: He is not in acute distress. Appearance: Normal appearance. He is well-developed. He is not ill-appearing or diaphoretic. HENT:      Head: Normocephalic and atraumatic. Right Ear: Tympanic membrane and external ear normal. Tympanic membrane is not injected or erythematous. Left Ear: Tympanic membrane and external ear normal. Tympanic membrane is not injected or erythematous. Nose: Nose normal.      Mouth/Throat:      Mouth: Mucous membranes are moist.      Pharynx: Oropharynx is clear. Uvula midline. Eyes:      General:         Right eye: No discharge. Left eye: No discharge. Conjunctiva/sclera: Conjunctivae normal.      Pupils: Pupils are equal, round, and reactive to light. Neck:      Thyroid: No thyromegaly. Trachea: Trachea normal.   Cardiovascular:      Rate and Rhythm: Normal rate and regular rhythm. Pulses: Normal pulses. Heart sounds: Normal heart sounds, S1 normal and S2 normal. No murmur heard. No friction rub. No gallop. Pulmonary:      Effort: Pulmonary effort is normal. No respiratory distress. Breath sounds: Normal breath sounds. No wheezing or rales. Chest:      Chest wall: No tenderness. Abdominal:      General: Bowel sounds are normal. There is no distension. Palpations: Abdomen is soft. There is no mass. Tenderness: There is no abdominal tenderness. There is no guarding or rebound. Musculoskeletal:         General: No tenderness or deformity. Normal range of motion. Cervical back: Full passive range of motion without pain, normal range of motion and neck supple.    Feet:      Right foot: Protective Sensation: 6 sites tested. 6 sites sensed. Skin integrity: Skin integrity normal.      Toenail Condition: Right toenails are normal.      Left foot:      Protective Sensation: 6 sites tested. 6 sites sensed. Skin integrity: Skin integrity normal.      Toenail Condition: Left toenails are normal.   Lymphadenopathy:      Cervical: No cervical adenopathy. Skin:     General: Skin is warm and dry. Capillary Refill: Capillary refill takes less than 2 seconds. Neurological:      Mental Status: He is alert and oriented to person, place, and time. Deep Tendon Reflexes: Reflexes are normal and symmetric. Psychiatric:         Mood and Affect: Mood normal.         Behavior: Behavior normal.         Thought Content: Thought content normal.           An electronic signature was used to authenticate this note.     --Rock Holland, HALIMA - CNP

## 2023-01-25 DIAGNOSIS — I10 HYPERTENSION, UNSPECIFIED TYPE: ICD-10-CM

## 2023-01-25 DIAGNOSIS — Z13.6 SCREENING FOR AAA (AORTIC ABDOMINAL ANEURYSM): ICD-10-CM

## 2023-01-25 DIAGNOSIS — E11.9 TYPE 2 DIABETES MELLITUS WITHOUT COMPLICATION, WITHOUT LONG-TERM CURRENT USE OF INSULIN (HCC): ICD-10-CM

## 2023-02-05 ENCOUNTER — TELEPHONE (OUTPATIENT)
Dept: FAMILY MEDICINE CLINIC | Age: 68
End: 2023-02-05

## 2023-02-06 NOTE — TELEPHONE ENCOUNTER
Notify patient that his Abdominal US, did not show any sign of an aneurysm. Also, this report was sent to myself via  and not results, this can cause a delay in test being reviewed and discussed with the patient.

## 2023-02-08 ENCOUNTER — TELEPHONE (OUTPATIENT)
Dept: FAMILY MEDICINE CLINIC | Age: 68
End: 2023-02-08

## 2023-02-09 NOTE — TELEPHONE ENCOUNTER
Notify patient that his blood sugar numbers are looking really good. If he does not have any concerns lets continue with what he is doing now and I will see him at his next appointment in April.

## 2023-04-12 LAB
ALBUMIN SERPL-MCNC: 4.1 G/DL
ALBUMIN SERPL-MCNC: 4.1 G/DL
ALP BLD-CCNC: 70 U/L
ALP BLD-CCNC: 70 U/L
ALT SERPL-CCNC: 12 U/L
ALT SERPL-CCNC: 12 U/L
ANION GAP SERPL CALCULATED.3IONS-SCNC: ABNORMAL MMOL/L
ANION GAP SERPL CALCULATED.3IONS-SCNC: ABNORMAL MMOL/L
AST SERPL-CCNC: 13 U/L
AST SERPL-CCNC: 13 U/L
AVERAGE GLUCOSE: ABNORMAL
BASOPHILS ABSOLUTE: 52 /ΜL
BASOPHILS RELATIVE PERCENT: 0.8 %
BILIRUB SERPL-MCNC: 0.6 MG/DL (ref 0.1–1.4)
BILIRUB SERPL-MCNC: 0.6 MG/DL (ref 0.1–1.4)
BUN BLDV-MCNC: 23 MG/DL
BUN BLDV-MCNC: 23 MG/DL
CALCIUM SERPL-MCNC: 9.4 MG/DL
CALCIUM SERPL-MCNC: 9.4 MG/DL
CHLORIDE BLD-SCNC: 106 MMOL/L
CHLORIDE BLD-SCNC: 106 MMOL/L
CHOLESTEROL, TOTAL: 145 MG/DL
CHOLESTEROL, TOTAL: 147 MG/DL
CHOLESTEROL/HDL RATIO: 3.8
CHOLESTEROL/HDL RATIO: NORMAL
CO2: 22 MMOL/L
CO2: 22 MMOL/L
CREAT SERPL-MCNC: 1.17 MG/DL
CREAT SERPL-MCNC: 1.17 MG/DL
CREATININE, URINE: 100
CREATININE, URINE: 100
EGFR: 69
EGFR: 69
EOSINOPHILS ABSOLUTE: 202 /ΜL
EOSINOPHILS RELATIVE PERCENT: 3.1 %
GLUCOSE BLD-MCNC: 190 MG/DL
GLUCOSE BLD-MCNC: 190 MG/DL
HBA1C MFR BLD: 8.9 %
HCT VFR BLD CALC: 45.3 % (ref 41–53)
HDLC SERPL-MCNC: 39 MG/DL (ref 35–70)
HDLC SERPL-MCNC: 40 MG/DL (ref 35–70)
HEMOGLOBIN: 15.1 G/DL (ref 13.5–17.5)
LDL CHOLESTEROL CALCULATED: 71 MG/DL (ref 0–160)
LDL CHOLESTEROL CALCULATED: 80 MG/DL (ref 0–160)
LYMPHOCYTES ABSOLUTE: 3211 /ΜL
LYMPHOCYTES RELATIVE PERCENT: 49.4 %
MCH RBC QN AUTO: 31.1 PG
MCHC RBC AUTO-ENTMCNC: 33.3 G/DL
MCV RBC AUTO: 93.2 FL
MICROALBUMIN/CREAT 24H UR: 0.6 MG/G{CREAT}
MICROALBUMIN/CREAT 24H UR: 0.6 MG/G{CREAT}
MICROALBUMIN/CREAT UR-RTO: 6
MICROALBUMIN/CREAT UR-RTO: 6
MONOCYTES ABSOLUTE: 351 /ΜL
MONOCYTES RELATIVE PERCENT: 5.4 %
NEUTROPHILS ABSOLUTE: 2685 /ΜL
NEUTROPHILS RELATIVE PERCENT: 41.3 %
NONHDLC SERPL-MCNC: 108 MG/DL
NONHDLC SERPL-MCNC: NORMAL MG/DL
PDW BLD-RTO: 12.1 %
PLATELET # BLD: 211 K/ΜL
PMV BLD AUTO: 10.4 FL
POTASSIUM SERPL-SCNC: 4.2 MMOL/L
POTASSIUM SERPL-SCNC: 4.2 MMOL/L
RBC # BLD: 4.86 10^6/ΜL
SODIUM BLD-SCNC: 138 MMOL/L
SODIUM BLD-SCNC: 138 MMOL/L
TOTAL PROTEIN: 6.5
TOTAL PROTEIN: 6.5
TRIGL SERPL-MCNC: 171 MG/DL
TRIGL SERPL-MCNC: 180 MG/DL
VLDLC SERPL CALC-MCNC: NORMAL MG/DL
VLDLC SERPL CALC-MCNC: NORMAL MG/DL
WBC # BLD: 6.5 10^3/ML

## 2023-04-16 LAB — MAGNESIUM: 1.8 MG/DL

## 2023-04-19 ENCOUNTER — OFFICE VISIT (OUTPATIENT)
Dept: FAMILY MEDICINE CLINIC | Age: 68
End: 2023-04-19
Payer: MEDICARE

## 2023-04-19 ENCOUNTER — TELEPHONE (OUTPATIENT)
Dept: FAMILY MEDICINE CLINIC | Age: 68
End: 2023-04-19

## 2023-04-19 VITALS
SYSTOLIC BLOOD PRESSURE: 116 MMHG | RESPIRATION RATE: 16 BRPM | DIASTOLIC BLOOD PRESSURE: 72 MMHG | HEIGHT: 68 IN | HEART RATE: 72 BPM | OXYGEN SATURATION: 97 % | BODY MASS INDEX: 30.98 KG/M2 | WEIGHT: 204.4 LBS

## 2023-04-19 DIAGNOSIS — Z00.00 WELCOME TO MEDICARE PREVENTIVE VISIT: Primary | ICD-10-CM

## 2023-04-19 DIAGNOSIS — E11.9 TYPE 2 DIABETES MELLITUS WITHOUT COMPLICATION, WITHOUT LONG-TERM CURRENT USE OF INSULIN (HCC): ICD-10-CM

## 2023-04-19 DIAGNOSIS — I10 HYPERTENSION, UNSPECIFIED TYPE: ICD-10-CM

## 2023-04-19 PROCEDURE — 1123F ACP DISCUSS/DSCN MKR DOCD: CPT | Performed by: NURSE PRACTITIONER

## 2023-04-19 PROCEDURE — 3074F SYST BP LT 130 MM HG: CPT | Performed by: NURSE PRACTITIONER

## 2023-04-19 PROCEDURE — G0402 INITIAL PREVENTIVE EXAM: HCPCS | Performed by: NURSE PRACTITIONER

## 2023-04-19 PROCEDURE — 3051F HG A1C>EQUAL 7.0%<8.0%: CPT | Performed by: NURSE PRACTITIONER

## 2023-04-19 PROCEDURE — 3017F COLORECTAL CA SCREEN DOC REV: CPT | Performed by: NURSE PRACTITIONER

## 2023-04-19 PROCEDURE — 3078F DIAST BP <80 MM HG: CPT | Performed by: NURSE PRACTITIONER

## 2023-04-19 SDOH — ECONOMIC STABILITY: INCOME INSECURITY: HOW HARD IS IT FOR YOU TO PAY FOR THE VERY BASICS LIKE FOOD, HOUSING, MEDICAL CARE, AND HEATING?: NOT HARD AT ALL

## 2023-04-19 SDOH — ECONOMIC STABILITY: FOOD INSECURITY: WITHIN THE PAST 12 MONTHS, YOU WORRIED THAT YOUR FOOD WOULD RUN OUT BEFORE YOU GOT MONEY TO BUY MORE.: NEVER TRUE

## 2023-04-19 SDOH — ECONOMIC STABILITY: HOUSING INSECURITY
IN THE LAST 12 MONTHS, WAS THERE A TIME WHEN YOU DID NOT HAVE A STEADY PLACE TO SLEEP OR SLEPT IN A SHELTER (INCLUDING NOW)?: NO

## 2023-04-19 SDOH — ECONOMIC STABILITY: FOOD INSECURITY: WITHIN THE PAST 12 MONTHS, THE FOOD YOU BOUGHT JUST DIDN'T LAST AND YOU DIDN'T HAVE MONEY TO GET MORE.: NEVER TRUE

## 2023-04-19 ASSESSMENT — PATIENT HEALTH QUESTIONNAIRE - PHQ9
1. LITTLE INTEREST OR PLEASURE IN DOING THINGS: 0
SUM OF ALL RESPONSES TO PHQ QUESTIONS 1-9: 0
SUM OF ALL RESPONSES TO PHQ QUESTIONS 1-9: 0
2. FEELING DOWN, DEPRESSED OR HOPELESS: 0
SUM OF ALL RESPONSES TO PHQ QUESTIONS 1-9: 0
SUM OF ALL RESPONSES TO PHQ QUESTIONS 1-9: 0
SUM OF ALL RESPONSES TO PHQ9 QUESTIONS 1 & 2: 0

## 2023-04-19 NOTE — PATIENT INSTRUCTIONS
that protects against both UVA and UVB radiation with an SPF of 30 or greater. Reapply every 2 to 3 hours or after sweating, drying off with a towel, or swimming. Always wear a seat belt when traveling in a car. Always wear a helmet when riding a bicycle or motorcycle.

## 2023-04-19 NOTE — PROGRESS NOTES
Health Maintenance Due   Topic Date Due    HIV screen  Never done    COVID-19 Vaccine (4 - Booster for Moderna series) 02/15/2022    AAA screen  Never done    Diabetic Alb to Cr ratio (uACR) test  04/13/2023    Lipids  04/13/2023    GFR test (Diabetes, CKD 3-4, OR last GFR 15-59)  04/13/2023
Reviewed and updated this visit:  Tobacco  Allergies  Meds  Problems  Med Hx  Surg Hx  Soc Hx  Fam Hx               Yon Araiza, APRN - CNP

## 2023-04-24 DIAGNOSIS — E11.9 TYPE 2 DIABETES MELLITUS WITHOUT COMPLICATION, WITHOUT LONG-TERM CURRENT USE OF INSULIN (HCC): ICD-10-CM

## 2023-04-24 RX ORDER — DULAGLUTIDE 4.5 MG/.5ML
4.5 INJECTION, SOLUTION SUBCUTANEOUS WEEKLY
Qty: 12 ADJUSTABLE DOSE PRE-FILLED PEN SYRINGE | Refills: 3 | Status: SHIPPED | OUTPATIENT
Start: 2023-04-24 | End: 2024-04-23

## 2023-04-24 RX ORDER — GLUCOSAMINE HCL/CHONDROITIN SU 500-400 MG
CAPSULE ORAL
Qty: 400 STRIP | Refills: 3 | Status: SHIPPED | OUTPATIENT
Start: 2023-04-24

## 2023-04-24 NOTE — TELEPHONE ENCOUNTER
Patient is calling and needs new test strips sent over to [facility] as the last ones were spelled incorrectly therefore they would not fill- per patient- company is doing everything possible to not pay for these test strips therefore making it very difficult. Order updated and spelled correctly     Also patient needs the new dose of Trulictiy 4.5 mg sent to [facility]- . . it cost him 90 $ to go to [facility]- also needs the 3 mg of Trulictiy  cancelled so they do not ship. Did not want to cancel until I got the ok from you regarding cancelling the Trulictiy  3 mg. Suni Balling

## 2023-04-25 NOTE — TELEPHONE ENCOUNTER
4.5 mg Trulicity and strips sent in to [address] [place] [address]. The 3 mg can be discontinued.

## 2023-07-05 ENCOUNTER — TELEPHONE (OUTPATIENT)
Dept: FAMILY MEDICINE CLINIC | Age: 68
End: 2023-07-05

## 2023-07-05 NOTE — TELEPHONE ENCOUNTER
Called and spoke with patient regarding the high co-pay for accu-chek smartview test strips, gave patient the options to switch for a lower cost. Patient denies at this time, would like to continue with smartview test strips.

## 2023-07-06 LAB
AVERAGE GLUCOSE: ABNORMAL
HBA1C MFR BLD: 9.1 %

## 2023-07-10 NOTE — RESULT ENCOUNTER NOTE
Looks like patient is not scheduled to be seen until November,   He is due for a DM f/u in the next 2 weeks. Please call and schedule.  Thank you

## 2023-08-18 ENCOUNTER — OFFICE VISIT (OUTPATIENT)
Dept: FAMILY MEDICINE CLINIC | Age: 68
End: 2023-08-18
Payer: MEDICARE

## 2023-08-18 VITALS
SYSTOLIC BLOOD PRESSURE: 132 MMHG | HEART RATE: 78 BPM | OXYGEN SATURATION: 95 % | WEIGHT: 204.4 LBS | RESPIRATION RATE: 16 BRPM | HEIGHT: 68 IN | DIASTOLIC BLOOD PRESSURE: 68 MMHG | BODY MASS INDEX: 30.98 KG/M2

## 2023-08-18 DIAGNOSIS — E11.9 TYPE 2 DIABETES MELLITUS WITHOUT COMPLICATION, WITHOUT LONG-TERM CURRENT USE OF INSULIN (HCC): Primary | ICD-10-CM

## 2023-08-18 DIAGNOSIS — I10 HYPERTENSION, UNSPECIFIED TYPE: ICD-10-CM

## 2023-08-18 DIAGNOSIS — E11.65 UNCONTROLLED TYPE 2 DIABETES MELLITUS WITH HYPERGLYCEMIA (HCC): ICD-10-CM

## 2023-08-18 DIAGNOSIS — E78.5 HYPERLIPIDEMIA, UNSPECIFIED HYPERLIPIDEMIA TYPE: ICD-10-CM

## 2023-08-18 LAB — HBA1C MFR BLD: 8.5 %

## 2023-08-18 PROCEDURE — G8427 DOCREV CUR MEDS BY ELIG CLIN: HCPCS | Performed by: NURSE PRACTITIONER

## 2023-08-18 PROCEDURE — 3052F HG A1C>EQUAL 8.0%<EQUAL 9.0%: CPT | Performed by: NURSE PRACTITIONER

## 2023-08-18 PROCEDURE — 99214 OFFICE O/P EST MOD 30 MIN: CPT | Performed by: NURSE PRACTITIONER

## 2023-08-18 PROCEDURE — 1123F ACP DISCUSS/DSCN MKR DOCD: CPT | Performed by: NURSE PRACTITIONER

## 2023-08-18 PROCEDURE — 1036F TOBACCO NON-USER: CPT | Performed by: NURSE PRACTITIONER

## 2023-08-18 PROCEDURE — 3074F SYST BP LT 130 MM HG: CPT | Performed by: NURSE PRACTITIONER

## 2023-08-18 PROCEDURE — 83037 HB GLYCOSYLATED A1C HOME DEV: CPT | Performed by: NURSE PRACTITIONER

## 2023-08-18 PROCEDURE — 3017F COLORECTAL CA SCREEN DOC REV: CPT | Performed by: NURSE PRACTITIONER

## 2023-08-18 PROCEDURE — G8417 CALC BMI ABV UP PARAM F/U: HCPCS | Performed by: NURSE PRACTITIONER

## 2023-08-18 PROCEDURE — 2022F DILAT RTA XM EVC RTNOPTHY: CPT | Performed by: NURSE PRACTITIONER

## 2023-08-18 PROCEDURE — 3078F DIAST BP <80 MM HG: CPT | Performed by: NURSE PRACTITIONER

## 2023-08-18 ASSESSMENT — ENCOUNTER SYMPTOMS
CHEST TIGHTNESS: 0
BLOOD IN STOOL: 0
ABDOMINAL DISTENTION: 0
VOMITING: 0
COUGH: 0
CONSTIPATION: 0
RECTAL PAIN: 0
DIARRHEA: 0
NAUSEA: 0
WHEEZING: 0
TROUBLE SWALLOWING: 0
FACIAL SWELLING: 0
SINUS PAIN: 0
ABDOMINAL PAIN: 0
COLOR CHANGE: 0
BACK PAIN: 0
SHORTNESS OF BREATH: 0
EYE DISCHARGE: 0
EYE PAIN: 0
EYE REDNESS: 0
SINUS PRESSURE: 0
EYE ITCHING: 0
SORE THROAT: 0

## 2023-08-18 NOTE — PROGRESS NOTES
Health Maintenance Due   Topic Date Due    HIV screen  Never done    COVID-19 Vaccine (4 - Booster for Moderna series) 02/15/2022    Pneumococcal 65+ years Vaccine (2 - PCV) 04/20/2023    Flu vaccine (1) 08/01/2023    Diabetic retinal exam  09/12/2023
discharge. Left eye: No discharge. Conjunctiva/sclera: Conjunctivae normal.      Pupils: Pupils are equal, round, and reactive to light. Neck:      Thyroid: No thyromegaly. Trachea: Trachea normal.   Cardiovascular:      Rate and Rhythm: Normal rate and regular rhythm. Pulses: Normal pulses. Heart sounds: Normal heart sounds, S1 normal and S2 normal. No murmur heard. No friction rub. No gallop. Pulmonary:      Effort: Pulmonary effort is normal. No respiratory distress. Breath sounds: Normal breath sounds. No wheezing or rales. Chest:      Chest wall: No tenderness. Abdominal:      General: Bowel sounds are normal. There is no distension. Palpations: Abdomen is soft. There is no mass. Tenderness: There is no abdominal tenderness. There is no guarding or rebound. Musculoskeletal:         General: No tenderness or deformity. Normal range of motion. Cervical back: Full passive range of motion without pain, normal range of motion and neck supple. Lymphadenopathy:      Cervical: No cervical adenopathy. Skin:     General: Skin is warm and dry. Capillary Refill: Capillary refill takes less than 2 seconds. Neurological:      General: No focal deficit present. Mental Status: He is alert and oriented to person, place, and time. Deep Tendon Reflexes: Reflexes are normal and symmetric. Psychiatric:         Mood and Affect: Mood normal.         Behavior: Behavior normal.           An electronic signature was used to authenticate this note.     --HALIMA Perez - CNP

## 2023-09-15 DIAGNOSIS — E11.9 TYPE 2 DIABETES MELLITUS WITHOUT COMPLICATION, WITHOUT LONG-TERM CURRENT USE OF INSULIN (HCC): ICD-10-CM

## 2023-09-15 NOTE — TELEPHONE ENCOUNTER
Last appointment this department: 8/18/2023  Next appointment this department: 11/15/2023   Last script wrote 11/10/22 for 90 days and 3 refills.

## 2023-10-10 ENCOUNTER — OFFICE VISIT (OUTPATIENT)
Dept: INTERNAL MEDICINE CLINIC | Age: 68
End: 2023-10-10

## 2023-10-10 VITALS — HEIGHT: 68 IN | BODY MASS INDEX: 30.55 KG/M2 | WEIGHT: 201.6 LBS

## 2023-10-10 DIAGNOSIS — E11.9 TYPE 2 DIABETES MELLITUS WITHOUT COMPLICATION, WITHOUT LONG-TERM CURRENT USE OF INSULIN (HCC): Primary | ICD-10-CM

## 2023-10-10 PROCEDURE — NBSRV NON-BILLABLE SERVICE: Performed by: DIETITIAN, REGISTERED

## 2023-10-10 NOTE — PROGRESS NOTES
[address] [facility]  [address] [facility] [address] [facility], [address] [place], [place] [address]  [phone] (phone)  [phone] (fax)    Patient Name: Chanel Stout Date of Birth: [id number]. MRN: [id number]      Assessment: Patient is a 74 y.o. male seen for Initial MNT visit for Type 2 DB.     -Nutritionally relevant labs:   Lab Results   Component Value Date/Time    LABA1C 8.5 08/18/2023 09:02 AM    LABA1C 9.1 (H) 07/06/2023 12:00 AM    LABA1C 8.9 (A) 04/12/2023 12:00 AM    GLUCOSE 190 (A) 04/12/2023 12:00 AM    GLUCOSE 190 (H) 04/12/2023 12:00 AM    CHOL 145 04/12/2023 12:00 AM    CHOL 147 04/12/2023 12:00 AM    HDL 40 04/12/2023 12:00 AM    HDL 39 04/12/2023 12:00 AM    LDLCALC 71 04/12/2023 12:00 AM    LDLCALC 80 04/12/2023 12:00 AM    TRIG 171 04/12/2023 12:00 AM    TRIG 180 04/12/2023 12:00 AM     -Blood sugar trends: Checking his BS with a meter. 1-2x/day. Did not have meter with him today. Sometimes his daughter will want her BS checked on this. FBS - 160's. Has been eating a lot of apples 2-3/day  Loves bananas, black cherries, grapes. Loves watermelon when in season. Likes applesauce. Has been making apple crisp lately. Follows the seasons on how he eats. Wife home from work currently s/p knee surgery. She is a . HH - includes wife and daughter.  -Food recall:   Up at 4 am some mornings. - pickle loaf OR campuzano eggs and toast Or english muffins with pbutter. Eats Potato bread. Likes spicy mustard, noble  Likes Pasta - shell macaroni. Dislikes alternative pasta - only likes white pasta. Has recently made [place] chili. Broccoli casserole with HB and potato crowns and shredded cheese. cream of celery or chicken   Breakfast: bologna tomato lettuce sandwich OR chili. Dinner: ~ 6 pm -   Snacks: cheese stick OR any fruit OR Celery and carrots and bell peppers OR Alfredo cheese/provolone or other.   Ate out yesterday - [employer] - lettuce salad and bread sticks and

## 2023-10-10 NOTE — PATIENT INSTRUCTIONS
1. )  Get familiar with reading the nutrition facts label by looking at serving size and total carbohydrates. - Without a label refer to your carb count guide booklet. 2.)  Measure foods for accuracy in carb counting.    3.)  Healthy carb choices:  whole grains, whole wheat pasta, starchy vegetables, fresh fruit and lowfat/non-fat milk and yogurt. 4.)  Your meal plan is found on the inside cover of your carb counting guide booklet:  1st meal or Brkf:  45 gms carbohydrates + 1-2 oz protein  2nd meal or Lunch: 45-60 gms carbohydrates + 2-3 oz protein + non-starchy veggies  3rd meal or Dinner:  45-60 gms carbohydrates + 2-3 oz protein + non- starchy veggies    Snack time:  15 - 20 gms carbohydrates + 1 oz protein    5.)  Choose lean protein most of the time and Cut in 1/2 added fats to help with weight loss efforts. 6.) Bring a 1-2 week food log and meter to next dietitian appt. Thanks. Check BS:  1-2x/day @ various times of the day. Fasting BS (1st thing in the morning) or before a meal (at least 4 hour since last ate), BS goal:  90 - 130  2 hours after the start of a meal, BS goal:  100 - 150     7.)  good that you keep active most of the time.   Have a plan for bad weather days!!

## 2023-10-23 ENCOUNTER — TELEPHONE (OUTPATIENT)
Dept: FAMILY MEDICINE CLINIC | Age: 68
End: 2023-10-23

## 2023-10-23 NOTE — TELEPHONE ENCOUNTER
Patient stopped in. He has his annual physical with Blayne Rosario CNP on 11/15/23 and would like to have his bloodwork done prior. He would like to to  any needed lab orders to have drawn at [facility]. Please advise. Once notified, patient will stop in to  lab orders. Thank you.

## 2023-10-24 NOTE — TELEPHONE ENCOUNTER
He does not need any labs , he had a Lipid panel on 10/18, and on 8/10/23 had multiple other labs done. Also please call Internal Medicine and see if he is set up with a Diabetic provider, He was referred last month for education and MEDICATION management.    Thank you

## 2023-11-15 ENCOUNTER — OFFICE VISIT (OUTPATIENT)
Dept: FAMILY MEDICINE CLINIC | Age: 68
End: 2023-11-15
Payer: MEDICARE

## 2023-11-15 VITALS
DIASTOLIC BLOOD PRESSURE: 72 MMHG | RESPIRATION RATE: 18 BRPM | WEIGHT: 195.4 LBS | BODY MASS INDEX: 29.61 KG/M2 | OXYGEN SATURATION: 98 % | HEIGHT: 68 IN | HEART RATE: 69 BPM | SYSTOLIC BLOOD PRESSURE: 132 MMHG

## 2023-11-15 DIAGNOSIS — I10 HYPERTENSION, UNSPECIFIED TYPE: ICD-10-CM

## 2023-11-15 DIAGNOSIS — E78.5 HYPERLIPIDEMIA, UNSPECIFIED HYPERLIPIDEMIA TYPE: ICD-10-CM

## 2023-11-15 DIAGNOSIS — E11.9 TYPE 2 DIABETES MELLITUS WITHOUT COMPLICATION, WITHOUT LONG-TERM CURRENT USE OF INSULIN (HCC): Primary | ICD-10-CM

## 2023-11-15 PROCEDURE — 1036F TOBACCO NON-USER: CPT | Performed by: NURSE PRACTITIONER

## 2023-11-15 PROCEDURE — 99214 OFFICE O/P EST MOD 30 MIN: CPT | Performed by: NURSE PRACTITIONER

## 2023-11-15 PROCEDURE — 3017F COLORECTAL CA SCREEN DOC REV: CPT | Performed by: NURSE PRACTITIONER

## 2023-11-15 PROCEDURE — 1123F ACP DISCUSS/DSCN MKR DOCD: CPT | Performed by: NURSE PRACTITIONER

## 2023-11-15 PROCEDURE — 3078F DIAST BP <80 MM HG: CPT | Performed by: NURSE PRACTITIONER

## 2023-11-15 PROCEDURE — 3075F SYST BP GE 130 - 139MM HG: CPT | Performed by: NURSE PRACTITIONER

## 2023-11-15 PROCEDURE — G8427 DOCREV CUR MEDS BY ELIG CLIN: HCPCS | Performed by: NURSE PRACTITIONER

## 2023-11-15 PROCEDURE — G8417 CALC BMI ABV UP PARAM F/U: HCPCS | Performed by: NURSE PRACTITIONER

## 2023-11-15 PROCEDURE — 3052F HG A1C>EQUAL 8.0%<EQUAL 9.0%: CPT | Performed by: NURSE PRACTITIONER

## 2023-11-15 PROCEDURE — 2022F DILAT RTA XM EVC RTNOPTHY: CPT | Performed by: NURSE PRACTITIONER

## 2023-11-15 PROCEDURE — G8482 FLU IMMUNIZE ORDER/ADMIN: HCPCS | Performed by: NURSE PRACTITIONER

## 2023-11-15 ASSESSMENT — ENCOUNTER SYMPTOMS
BACK PAIN: 0
BLOOD IN STOOL: 0
EYE REDNESS: 0
SINUS PAIN: 0
VOMITING: 0
EYE DISCHARGE: 0
ABDOMINAL DISTENTION: 0
RECTAL PAIN: 0
CHEST TIGHTNESS: 0
EYE PAIN: 0
SINUS PRESSURE: 0
ABDOMINAL PAIN: 0
SHORTNESS OF BREATH: 0
DIARRHEA: 0
CONSTIPATION: 0
TROUBLE SWALLOWING: 0
SORE THROAT: 0
FACIAL SWELLING: 0
NAUSEA: 0
EYE ITCHING: 0
COUGH: 0
COLOR CHANGE: 0
WHEEZING: 0

## 2023-11-15 NOTE — PROGRESS NOTES
Health Maintenance Due   Topic Date Due    HIV screen  Never done    Hepatitis B vaccine (1 of 3 - Risk 3-dose series) Never done    Pneumococcal 65+ years Vaccine (2 - PCV) 04/20/2023    COVID-19 Vaccine (4 - 2023-24 season) 09/01/2023    Diabetic foot exam  10/19/2023

## 2023-11-15 NOTE — PROGRESS NOTES
[facility]  [address] [place] [address]  Dept: [phone]  Loc: [phone]    Shant Rosenbaum (:  1955) is a 74 y.o. male,Established patient, here for evaluation of the following chief complaint(s):  Diabetes (A1c 8.5 in August, checked  - 7.5. Continues with Trulicity, Metformin, Vin Rao)      ASSESSMENT/PLAN:  1. Type 2 diabetes mellitus without complication, without long-term current use of insulin (HCC)  Controlled. Continue Farxiga 10 mg, Trulicty 4.5 mg weekly and metformin 1000 mg BID. A1C 7.5%   Previous 8.5%     -      DIABETES FOOT EXAM  2. Hypertension, unspecified type  Controlled. 132/72 . Continue losartan 50 mg   3. Hyperlipidemia, unspecified hyperlipidemia type  Zocor 20 mg. Lab Results   Component Value Date    CHOL 145 10/18/2023    TRIG 171 10/18/2023    HDL 40 10/18/2023    LDLCALC 71 10/18/2023    VLDL 34 2020    CHOLHDLRATIO 3.8 2023       Return in about 3 months (around 2/15/2024) for DM f/u . SUBJECTIVE/OBJECTIVE:  Patient presents with:  Diabetes: A1c 8.5 in August, checked  - 7.5. Continues with Trulicity, Metformin, Vin Yoderdago             has a past medical history of History of kidney stones, Hyperlipidemia, Hypertension, Type 2 diabetes mellitus ([address]), and Type II or unspecified type diabetes mellitus without mention of complication, not stated as uncontrolled. Review of Systems   Constitutional:  Negative for activity change, appetite change, fatigue, fever and unexpected weight change. HENT:  Negative for congestion, dental problem, ear pain, facial swelling, mouth sores, postnasal drip, sinus pressure, sinus pain, sore throat and trouble swallowing. Eyes:  Negative for pain, discharge, redness, itching and visual disturbance. Respiratory:  Negative for cough, chest tightness, shortness of breath and wheezing.     Cardiovascular:  Negative for chest pain,

## 2024-02-21 ENCOUNTER — OFFICE VISIT (OUTPATIENT)
Dept: FAMILY MEDICINE CLINIC | Age: 69
End: 2024-02-21

## 2024-02-21 VITALS
RESPIRATION RATE: 18 BRPM | WEIGHT: 195.6 LBS | BODY MASS INDEX: 29.64 KG/M2 | DIASTOLIC BLOOD PRESSURE: 78 MMHG | HEART RATE: 71 BPM | OXYGEN SATURATION: 97 % | TEMPERATURE: 97.6 F | HEIGHT: 68 IN | SYSTOLIC BLOOD PRESSURE: 146 MMHG

## 2024-02-21 DIAGNOSIS — M25.511 ACUTE PAIN OF RIGHT SHOULDER: ICD-10-CM

## 2024-02-21 DIAGNOSIS — I10 HYPERTENSION, UNSPECIFIED TYPE: ICD-10-CM

## 2024-02-21 DIAGNOSIS — M25.50 ARTHRALGIA, UNSPECIFIED JOINT: ICD-10-CM

## 2024-02-21 DIAGNOSIS — E78.5 HYPERLIPIDEMIA, UNSPECIFIED HYPERLIPIDEMIA TYPE: ICD-10-CM

## 2024-02-21 DIAGNOSIS — E11.9 TYPE 2 DIABETES MELLITUS WITHOUT COMPLICATION, WITHOUT LONG-TERM CURRENT USE OF INSULIN (HCC): Primary | ICD-10-CM

## 2024-02-21 DIAGNOSIS — R79.89 LOW SERUM VITAMIN D: ICD-10-CM

## 2024-02-21 DIAGNOSIS — Z23 NEED FOR VACCINATION: ICD-10-CM

## 2024-02-21 LAB
25(OH)D3 SERPL-MCNC: 28 NG/ML (ref 30–100)
ANION GAP SERPL CALC-SCNC: 10 MEQ/L (ref 8–16)
BASOPHILS ABSOLUTE: 0 THOU/MM3 (ref 0–0.1)
BASOPHILS NFR BLD AUTO: 0.6 %
BUN SERPL-MCNC: 20 MG/DL (ref 7–22)
CALCIUM SERPL-MCNC: 10 MG/DL (ref 8.5–10.5)
CHLORIDE SERPL-SCNC: 103 MEQ/L (ref 98–111)
CO2 SERPL-SCNC: 27 MEQ/L (ref 23–33)
CREAT SERPL-MCNC: 1.1 MG/DL (ref 0.4–1.2)
CRP SERPL-MCNC: < 0.3 MG/DL (ref 0–1)
DEPRECATED RDW RBC AUTO: 43.1 FL (ref 35–45)
EOSINOPHIL NFR BLD AUTO: 1.7 %
EOSINOPHILS ABSOLUTE: 0.1 THOU/MM3 (ref 0–0.4)
ERYTHROCYTE [DISTWIDTH] IN BLOOD BY AUTOMATED COUNT: 12.4 % (ref 11.5–14.5)
ERYTHROCYTE [SEDIMENTATION RATE] IN BLOOD BY WESTERGREN METHOD: 2 MM/HR (ref 0–10)
GFR SERPL CREATININE-BSD FRML MDRD: > 60 ML/MIN/1.73M2
GLUCOSE SERPL-MCNC: 156 MG/DL (ref 70–108)
HBA1C MFR BLD: 6.9 %
HCT VFR BLD AUTO: 48.3 % (ref 42–52)
HGB BLD-MCNC: 15.8 GM/DL (ref 14–18)
IMM GRANULOCYTES # BLD AUTO: 0.01 THOU/MM3 (ref 0–0.07)
IMM GRANULOCYTES NFR BLD AUTO: 0.2 %
LYMPHOCYTES ABSOLUTE: 2.4 THOU/MM3 (ref 1–4.8)
LYMPHOCYTES NFR BLD AUTO: 37.9 %
MCH RBC QN AUTO: 31 PG (ref 26–33)
MCHC RBC AUTO-ENTMCNC: 32.7 GM/DL (ref 32.2–35.5)
MCV RBC AUTO: 94.7 FL (ref 80–94)
MONOCYTES ABSOLUTE: 0.3 THOU/MM3 (ref 0.4–1.3)
MONOCYTES NFR BLD AUTO: 5.2 %
NEUTROPHILS NFR BLD AUTO: 54.4 %
NRBC BLD AUTO-RTO: 0 /100 WBC
PLATELET # BLD AUTO: 225 THOU/MM3 (ref 130–400)
PMV BLD AUTO: 10 FL (ref 9.4–12.4)
POTASSIUM SERPL-SCNC: 4.4 MEQ/L (ref 3.5–5.2)
RBC # BLD AUTO: 5.1 MILL/MM3 (ref 4.7–6.1)
RHEUMATOID FACT SERPL-ACNC: < 10 IU/ML (ref 0–13)
SEGMENTED NEUTROPHILS ABSOLUTE COUNT: 3.5 THOU/MM3 (ref 1.8–7.7)
SODIUM SERPL-SCNC: 140 MEQ/L (ref 135–145)
WBC # BLD AUTO: 6.4 THOU/MM3 (ref 4.8–10.8)

## 2024-02-21 ASSESSMENT — ENCOUNTER SYMPTOMS
BACK PAIN: 0
VOMITING: 0
SINUS PRESSURE: 0
ABDOMINAL DISTENTION: 0
SINUS PAIN: 0
EYE PAIN: 0
TROUBLE SWALLOWING: 0
WHEEZING: 0
FACIAL SWELLING: 0
EYE DISCHARGE: 0
COUGH: 0
RECTAL PAIN: 0
CONSTIPATION: 0
BLOOD IN STOOL: 0
ABDOMINAL PAIN: 0
SHORTNESS OF BREATH: 0
NAUSEA: 0
EYE REDNESS: 0
DIARRHEA: 0
COLOR CHANGE: 0
EYE ITCHING: 0
SORE THROAT: 0
CHEST TIGHTNESS: 0

## 2024-02-21 ASSESSMENT — PATIENT HEALTH QUESTIONNAIRE - PHQ9
2. FEELING DOWN, DEPRESSED OR HOPELESS: 0
SUM OF ALL RESPONSES TO PHQ QUESTIONS 1-9: 0
SUM OF ALL RESPONSES TO PHQ QUESTIONS 1-9: 0
1. LITTLE INTEREST OR PLEASURE IN DOING THINGS: 0
SUM OF ALL RESPONSES TO PHQ QUESTIONS 1-9: 0
SUM OF ALL RESPONSES TO PHQ QUESTIONS 1-9: 0
SUM OF ALL RESPONSES TO PHQ9 QUESTIONS 1 & 2: 0

## 2024-02-21 NOTE — PROGRESS NOTES
After obtaining consent, and per orders of Talia HERNANDEZ, injection of Prevnar 20 given in Left deltoid by BARBARA HINKLE LPN. Patient instructed to remain in clinic for 20 minutes afterwards, and to report any adverse reaction to me immediately. Patient tolerated well. VIS provided to patient. [facility] reviewed prior to administration.     Immunizations Administered       Name Date Dose Route    Pneumococcal, PCV20, PREVNAR 20, (age 6w+), IM, 0.5mL 2/21/2024 0.5 mL Intramuscular    Site: Deltoid- Left    Lot: [id number]    NDC: 8291-9393-91          
Health Maintenance Due   Topic Date Due    Respiratory Syncytial Virus (RSV) Pregnant or age 60 yrs+ (1 - 1-dose 60+ series) Never done    Pneumococcal 65+ years Vaccine (2 - PCV) 04/20/2023    COVID-19 Vaccine (4 - 2023-24 season) 09/01/2023     
Venipuncture obtained from right arm. Patient tolerated the procedure without complications or complaints.     
month follow up for diabetes. A1c 7.5 in November, will recheck today.     Fell a week ago in the garage , pain in right shoulder upper arm          has a past medical history of History of kidney stones, Hyperlipidemia, Hypertension, Type 2 diabetes mellitus (HCC), and Type II or unspecified type diabetes mellitus without mention of complication, not stated as uncontrolled.    Review of Systems   Constitutional:  Negative for activity change, appetite change, fatigue, fever and unexpected weight change.   HENT:  Negative for congestion, dental problem, ear pain, facial swelling, mouth sores, postnasal drip, sinus pressure, sinus pain, sore throat and trouble swallowing.    Eyes:  Negative for pain, discharge, redness, itching and visual disturbance.   Respiratory:  Negative for cough, chest tightness, shortness of breath and wheezing.    Cardiovascular:  Negative for chest pain, palpitations and leg swelling.   Gastrointestinal:  Negative for abdominal distention, abdominal pain, blood in stool, constipation, diarrhea, nausea, rectal pain and vomiting.   Endocrine: Negative for cold intolerance and heat intolerance.   Genitourinary:  Negative for difficulty urinating, dysuria, frequency, hematuria, penile pain, scrotal swelling, testicular pain and urgency.   Musculoskeletal:  Positive for arthralgias and myalgias. Negative for back pain, gait problem and neck pain.        Right shoulder, upper arm pain      Skin:  Negative for color change, rash and wound.   Neurological:  Negative for dizziness, seizures, weakness, light-headedness, numbness and headaches.   Psychiatric/Behavioral:  Negative for agitation and sleep disturbance.        Physical Exam  Vitals and nursing note reviewed.   Constitutional:       General: He is not in acute distress.     Appearance: Normal appearance. He is well-developed. He is not ill-appearing or diaphoretic.   HENT:      Head: Normocephalic and atraumatic.      Right Ear: Tympanic

## 2024-02-23 ENCOUNTER — OFFICE VISIT (OUTPATIENT)
Age: 69
End: 2024-02-23

## 2024-02-23 VITALS
OXYGEN SATURATION: 97 % | HEART RATE: 59 BPM | DIASTOLIC BLOOD PRESSURE: 80 MMHG | RESPIRATION RATE: 14 BRPM | SYSTOLIC BLOOD PRESSURE: 160 MMHG | TEMPERATURE: 98.1 F

## 2024-02-23 DIAGNOSIS — E78.5 HYPERLIPIDEMIA, UNSPECIFIED HYPERLIPIDEMIA TYPE: Primary | ICD-10-CM

## 2024-02-23 DIAGNOSIS — E11.65 UNCONTROLLED TYPE 2 DIABETES MELLITUS WITH HYPERGLYCEMIA (HCC): ICD-10-CM

## 2024-02-23 DIAGNOSIS — J30.9 ALLERGIC RHINITIS, UNSPECIFIED SEASONALITY, UNSPECIFIED TRIGGER: ICD-10-CM

## 2024-02-23 DIAGNOSIS — I10 PRIMARY HYPERTENSION: ICD-10-CM

## 2024-02-23 LAB — NUCLEAR IGG SER QL IA: DETECTED

## 2024-02-23 RX ORDER — LOSARTAN POTASSIUM 50 MG/1
50 TABLET ORAL DAILY
Qty: 90 TABLET | Refills: 0 | Status: SHIPPED | OUTPATIENT
Start: 2024-02-23

## 2024-02-23 RX ORDER — CETIRIZINE HYDROCHLORIDE 10 MG/1
10 TABLET ORAL DAILY
Qty: 90 TABLET | Refills: 0 | Status: SHIPPED | OUTPATIENT
Start: 2024-02-23

## 2024-02-23 RX ORDER — SIMVASTATIN 20 MG
20 TABLET ORAL NIGHTLY
Qty: 90 TABLET | Refills: 0 | Status: SHIPPED | OUTPATIENT
Start: 2024-02-23

## 2024-02-23 NOTE — PATIENT INSTRUCTIONS
Continue to check blood sugar daily  Take prescriptions as prescribed  Continue to protect your neck   Thank you for letting me take care of you

## 2024-02-23 NOTE — PROGRESS NOTES
Pt requesting refill on metformin simvastatin  losartan cetirizine, ibuprofen as need    He receives truiicty 4.5 mg and farixga 1/2 10mg tab bid.    Surgeries right 2nd and 4th digit  Neck     Social non smoker   No routine exercise regimen, has active lifestyle   \"Eat what I want to eat\" he lost about 70 lbs in past 2 years.  Diabetes     Home monitoring daily, ranges between 99-150s     Gets feet checked by pcp every 3mo     Last eye dr exam septemeber 2023     A1c 6.9 2/21/24  Htn   Occasional bp  checks,no high reading avg 185/80    Chronic right shoulder pain,has mri tomorrow

## 2024-02-24 LAB
ANA PAT SER IF-IMP: NORMAL
NUCLEAR IGG SER QL IF: NORMAL

## 2024-02-26 ENCOUNTER — TELEPHONE (OUTPATIENT)
Dept: FAMILY MEDICINE CLINIC | Age: 69
End: 2024-02-26

## 2024-02-26 DIAGNOSIS — E11.9 TYPE 2 DIABETES MELLITUS WITHOUT COMPLICATION, WITHOUT LONG-TERM CURRENT USE OF INSULIN (HCC): ICD-10-CM

## 2024-02-26 DIAGNOSIS — R76.8 ANA POSITIVE: Primary | ICD-10-CM

## 2024-02-26 NOTE — TELEPHONE ENCOUNTER
Spoke with patient, requested Farxiga and Trulicity to be sent through [employer] due to new insurance coverage.     Patient also stated he would like referral sent to [facility].

## 2024-02-26 NOTE — TELEPHONE ENCOUNTER
----- Message from HALIMA Guadarrama - CNP sent at 2/26/2024 10:12 AM EST -----  Please notify Deven that his CHRISTOPHER lab was abnormal, with that being said and his widespread joint pain, I am referring him to rheumatology. Does he have a preference? [place] ([facility]) or [place] ([facility])?

## 2024-02-28 RX ORDER — DULAGLUTIDE 4.5 MG/.5ML
4.5 INJECTION, SOLUTION SUBCUTANEOUS WEEKLY
Qty: 12 ADJUSTABLE DOSE PRE-FILLED PEN SYRINGE | Refills: 3 | Status: SHIPPED | OUTPATIENT
Start: 2024-02-28 | End: 2025-02-27

## 2024-02-28 RX ORDER — DAPAGLIFLOZIN 10 MG/1
10 TABLET, FILM COATED ORAL EVERY MORNING
Qty: 90 TABLET | Refills: 3 | Status: SHIPPED | OUTPATIENT
Start: 2024-02-28 | End: 2024-03-01 | Stop reason: SDUPTHER

## 2024-03-01 DIAGNOSIS — E11.9 TYPE 2 DIABETES MELLITUS WITHOUT COMPLICATION, WITHOUT LONG-TERM CURRENT USE OF INSULIN (HCC): ICD-10-CM

## 2024-03-01 RX ORDER — DAPAGLIFLOZIN 10 MG/1
10 TABLET, FILM COATED ORAL EVERY MORNING
Qty: 90 TABLET | Refills: 3 | Status: SHIPPED | OUTPATIENT
Start: 2024-03-01 | End: 2025-03-01

## 2024-03-01 NOTE — TELEPHONE ENCOUNTER
Received PA denial stating Dapagliflozin is not covered under patient's plan. Farxiga appears to be covered on formulary. Spoke with [facility], stated generic is not covered by insurance, but Farxiga is. Needs to be filled as brand name only. Is there a way to change this and \"SHAAN\"?

## 2024-03-07 ENCOUNTER — TELEPHONE (OUTPATIENT)
Dept: FAMILY MEDICINE CLINIC | Age: 69
End: 2024-03-07

## 2024-03-07 DIAGNOSIS — E11.9 TYPE 2 DIABETES MELLITUS WITHOUT COMPLICATION, WITHOUT LONG-TERM CURRENT USE OF INSULIN (HCC): Primary | ICD-10-CM

## 2024-03-07 NOTE — TELEPHONE ENCOUNTER
Pt is asking for blood work to be ordered so he can get it done a week before appt.     Pt is also asking for a script for Accu-[employer]ack smartview 2 boxes . Please send to [facility]

## 2024-03-11 NOTE — TELEPHONE ENCOUNTER
Labs ordered, but he is not due for them or his appointment until 5/28  Also is he taking a vit D supplement?   Also the request for [employer] smart view boxes... are these test strips? I cannot find them in the ordering system

## 2024-03-12 NOTE — TELEPHONE ENCOUNTER
Called patient, he is not taking a vitamin d.  He does need the test strips filled. It looks like last time it was just ordered generically and we added a note to the pharmacy on the brand he uses.

## 2024-03-13 ENCOUNTER — HOSPITAL ENCOUNTER (OUTPATIENT)
Dept: MRI IMAGING | Age: 69
Discharge: HOME | End: 2024-03-13
Payer: COMMERCIAL

## 2024-03-13 DIAGNOSIS — M25.511 ACUTE PAIN OF RIGHT SHOULDER: ICD-10-CM

## 2024-03-13 PROCEDURE — 73221 MRI JOINT UPR EXTREM W/O DYE: CPT

## 2024-03-15 DIAGNOSIS — M75.121 COMPLETE TEAR OF RIGHT ROTATOR CUFF, UNSPECIFIED WHETHER TRAUMATIC: Primary | ICD-10-CM

## 2024-03-15 NOTE — RESULT ENCOUNTER NOTE
Please notify patient that his MRI showed a two tears in his  rotator cuff.   He will need to see Orthopedics to discuss treatment options. Does he have a preference on who he see's?

## 2024-03-19 ENCOUNTER — TELEPHONE (OUTPATIENT)
Dept: FAMILY MEDICINE CLINIC | Age: 69
End: 2024-03-19

## 2024-03-19 DIAGNOSIS — E11.9 TYPE 2 DIABETES MELLITUS WITHOUT COMPLICATION, WITHOUT LONG-TERM CURRENT USE OF INSULIN (HCC): ICD-10-CM

## 2024-03-19 RX ORDER — DULAGLUTIDE 4.5 MG/.5ML
4.5 INJECTION, SOLUTION SUBCUTANEOUS WEEKLY
Qty: 12 ADJUSTABLE DOSE PRE-FILLED PEN SYRINGE | Refills: 3 | Status: SHIPPED | OUTPATIENT
Start: 2024-03-19 | End: 2025-03-19

## 2024-03-19 NOTE — TELEPHONE ENCOUNTER
I was not told the patient wanted me to call him.  I sent a result note on 3/15 stating the below        Please notify patient that his MRI showed a two tears in his  rotator cuff.  He will need to see Orthopedics to discuss treatment options. Does he have a preference on who he see's?  Referral to [facility] in [place] placed.     Does he have questions regarding the referral?    Trulicity Rx printed for patient to .

## 2024-03-19 NOTE — TELEPHONE ENCOUNTER
Patient calling stating he is having a hard time getting the trulicty filled. Patient is wanting to  a paper script so he can call and see which pharmacy can get it in.    Also needs his test strips filled. Uses accu-chek test strips.     Patient stated you were going to call him in regards to his shoulder? I tried to get an appointment set up but he said you told him you'd call in a few days

## 2024-05-23 ENCOUNTER — OFFICE VISIT (OUTPATIENT)
Age: 69
End: 2024-05-23

## 2024-05-23 ENCOUNTER — NURSE ONLY (OUTPATIENT)
Dept: LAB | Age: 69
End: 2024-05-23

## 2024-05-23 ENCOUNTER — OFFICE VISIT (OUTPATIENT)
Dept: RHEUMATOLOGY | Age: 69
End: 2024-05-23
Payer: MEDICARE

## 2024-05-23 VITALS
HEART RATE: 72 BPM | SYSTOLIC BLOOD PRESSURE: 116 MMHG | WEIGHT: 188 LBS | BODY MASS INDEX: 28.49 KG/M2 | DIASTOLIC BLOOD PRESSURE: 72 MMHG | OXYGEN SATURATION: 97 % | HEIGHT: 68 IN

## 2024-05-23 VITALS
BODY MASS INDEX: 29.51 KG/M2 | OXYGEN SATURATION: 97 % | HEIGHT: 67 IN | WEIGHT: 188 LBS | DIASTOLIC BLOOD PRESSURE: 72 MMHG | HEART RATE: 72 BPM | TEMPERATURE: 99 F | SYSTOLIC BLOOD PRESSURE: 116 MMHG | RESPIRATION RATE: 18 BRPM

## 2024-05-23 DIAGNOSIS — R76.8 POSITIVE ANA (ANTINUCLEAR ANTIBODY): Primary | ICD-10-CM

## 2024-05-23 DIAGNOSIS — Z76.0 MEDICATION REFILL: Primary | ICD-10-CM

## 2024-05-23 DIAGNOSIS — R76.8 POSITIVE ANA (ANTINUCLEAR ANTIBODY): ICD-10-CM

## 2024-05-23 DIAGNOSIS — I10 HYPERTENSION, UNSPECIFIED TYPE: ICD-10-CM

## 2024-05-23 DIAGNOSIS — E11.9 TYPE 2 DIABETES MELLITUS WITHOUT COMPLICATION, WITHOUT LONG-TERM CURRENT USE OF INSULIN (HCC): ICD-10-CM

## 2024-05-23 DIAGNOSIS — J30.2 SEASONAL ALLERGIES: ICD-10-CM

## 2024-05-23 DIAGNOSIS — E78.5 HYPERLIPIDEMIA, UNSPECIFIED HYPERLIPIDEMIA TYPE: ICD-10-CM

## 2024-05-23 PROBLEM — M19.011 PRIMARY OSTEOARTHRITIS OF RIGHT SHOULDER: Status: ACTIVE | Noted: 2024-05-23

## 2024-05-23 PROBLEM — M75.121 COMPLETE TEAR OF RIGHT ROTATOR CUFF: Status: ACTIVE | Noted: 2024-05-23

## 2024-05-23 LAB
C3C SERPL-MCNC: 170 MG/DL (ref 90–180)
C4 SERPL-MCNC: 42 MG/DL (ref 10–40)

## 2024-05-23 PROCEDURE — 3078F DIAST BP <80 MM HG: CPT | Performed by: INTERNAL MEDICINE

## 2024-05-23 PROCEDURE — 3017F COLORECTAL CA SCREEN DOC REV: CPT | Performed by: INTERNAL MEDICINE

## 2024-05-23 PROCEDURE — G8417 CALC BMI ABV UP PARAM F/U: HCPCS | Performed by: INTERNAL MEDICINE

## 2024-05-23 PROCEDURE — 99204 OFFICE O/P NEW MOD 45 MIN: CPT | Performed by: INTERNAL MEDICINE

## 2024-05-23 PROCEDURE — 1036F TOBACCO NON-USER: CPT | Performed by: INTERNAL MEDICINE

## 2024-05-23 PROCEDURE — 1123F ACP DISCUSS/DSCN MKR DOCD: CPT | Performed by: INTERNAL MEDICINE

## 2024-05-23 PROCEDURE — G8427 DOCREV CUR MEDS BY ELIG CLIN: HCPCS | Performed by: INTERNAL MEDICINE

## 2024-05-23 PROCEDURE — 3074F SYST BP LT 130 MM HG: CPT | Performed by: INTERNAL MEDICINE

## 2024-05-23 RX ORDER — SIMVASTATIN 20 MG
20 TABLET ORAL NIGHTLY
Qty: 90 TABLET | Refills: 0 | Status: SHIPPED | OUTPATIENT
Start: 2024-05-23 | End: 2024-08-21

## 2024-05-23 RX ORDER — M-VIT,TX,IRON,MINS/CALC/FOLIC 27MG-0.4MG
1 TABLET ORAL DAILY
COMMUNITY

## 2024-05-23 RX ORDER — VALSARTAN 40 MG/1
TABLET ORAL
COMMUNITY

## 2024-05-23 RX ORDER — CETIRIZINE HYDROCHLORIDE 10 MG/1
10 TABLET ORAL DAILY
Qty: 90 TABLET | Refills: 0 | Status: SHIPPED | OUTPATIENT
Start: 2024-05-23 | End: 2024-08-21

## 2024-05-23 RX ORDER — LOSARTAN POTASSIUM 50 MG/1
50 TABLET ORAL DAILY
Qty: 90 TABLET | Refills: 0 | Status: SHIPPED | OUTPATIENT
Start: 2024-05-23 | End: 2024-08-21

## 2024-05-23 ASSESSMENT — ENCOUNTER SYMPTOMS
CHEST TIGHTNESS: 0
SWOLLEN GLANDS: 0
ABDOMINAL PAIN: 0
COUGH: 0
COUGH: 0
NAUSEA: 0
SHORTNESS OF BREATH: 0
SORE THROAT: 0
SINUS PRESSURE: 0
VISUAL CHANGE: 0
SHORTNESS OF BREATH: 0
ABDOMINAL PAIN: 0
CHANGE IN BOWEL HABIT: 0
VOMITING: 0
NAUSEA: 0
BLOOD IN STOOL: 0
VOMITING: 0

## 2024-05-23 NOTE — PROGRESS NOTES
MG/0.5ML SOPN, Inject 4.5 mg into the skin once a week, Disp: 12 Adjustable Dose Pre-filled Pen Syringe, Rfl: 3    FARXIGA 10 MG tablet, Take 1 tablet by mouth every morning, Disp: 90 tablet, Rfl: 3    simvastatin (ZOCOR) 20 MG tablet, Take 1 tablet by mouth nightly, Disp: 90 tablet, Rfl: 0    losartan (COZAAR) 50 MG tablet, Take 1 tablet by mouth daily, Disp: 90 tablet, Rfl: 0    cetirizine (ZYRTEC) 10 MG tablet, Take 1 tablet by mouth daily, Disp: 90 tablet, Rfl: 0    metFORMIN (GLUCOPHAGE) 1000 MG tablet, Take 1 tablet by mouth 2 times daily (with meals), Disp: 180 tablet, Rfl: 0    Acetaminophen (TYLENOL PO), Take 500 mg by mouth as needed, Disp: , Rfl:     blood glucose monitor strips, Test 4 times a day & as needed for symptoms of irregular blood glucose. Dispense sufficient amount for indicated testing frequency plus additional to accommodate PRN testing needs., Disp: 400 strip, Rfl: 3    ibuprofen (ADVIL;MOTRIN) 600 MG tablet, Take 1 tablet by mouth every 6 hours as needed for Pain, Disp: 120 tablet, Rfl: 1    Insulin Pen Needle 31G X 5 MM MISC, 1 each by Does not apply route 2 times daily, Disp: 200 each, Rfl: 3    Allergies:    Sulfa antibiotics    Social History:     reports that he quit smoking about 44 years ago. His smoking use included cigarettes. He started smoking about 46 years ago. He has a 0.5 pack-year smoking history. He has never used smokeless tobacco. He reports that he does not drink alcohol and does not use drugs.    Family History:   family history includes Arthritis in his brother, mother, and sister; Bleeding Prob in his father; Cancer in his brother; Diabetes in his mother and sister; Early Death in his brother; High Blood Pressure in his sister; Miscarriages / Stillbirths in his daughter and mother; Other Cancer in his brother; Stroke in his father and mother.      REVIEW OF SYSTEMS:    Review of Systems   Constitutional:  Positive for fatigue. Negative for chills and fever.   HENT:

## 2024-05-23 NOTE — PROGRESS NOTES
Deven Benson (:  1955) is a 68 y.o. male,Established patient, here for evaluation of the following chief complaint(s):  Medication Refill      Assessment & Plan   1. Medication refill  2. Hypertension, unspecified type  -     losartan (COZAAR) 50 MG tablet; Take 1 tablet by mouth daily, Disp-90 tablet, R-0Print  3. Type 2 diabetes mellitus without complication, without long-term current use of insulin (HCC)  -     metFORMIN (GLUCOPHAGE) 1000 MG tablet; Take 1 tablet by mouth 2 times daily (with meals), Disp-180 tablet, R-0Print  4. Hyperlipidemia, unspecified hyperlipidemia type  -     simvastatin (ZOCOR) 20 MG tablet; Take 1 tablet by mouth nightly, Disp-90 tablet, R-0Print  5. Seasonal allergies  -     cetirizine (ZYRTEC) 10 MG tablet; Take 1 tablet by mouth daily, Disp-90 tablet, R-0Print      No follow-ups on file.     68-year-old male presents to clinic today for medication refill.  This patient is duly managed has a PCP Maisha CNP.  Patient is here today to  medications.  Discussed diagnoses and medications at length with patient.  All questions answered per patient's satisfaction.  Recommended patient to do biometric screening as soon as possible.  Patient states that he will schedule soon.  Recommended patient to follow-up as needed with us.  Subjective   Medication Refill  This is a recurrent problem. The current episode started today. Associated symptoms include myalgias. Pertinent negatives include no abdominal pain, anorexia, arthralgias, change in bowel habit, chest pain, chills, congestion, coughing, diaphoresis, fatigue, fever, headaches, joint swelling, nausea, neck pain, numbness, rash, sore throat, swollen glands, urinary symptoms, vertigo, visual change, vomiting or weakness.       Review of Systems   Constitutional:  Negative for activity change, appetite change, chills, diaphoresis, fatigue and fever.   HENT:  Negative for congestion, sinus pressure and sore throat.    Eyes:

## 2024-05-28 ENCOUNTER — OFFICE VISIT (OUTPATIENT)
Dept: FAMILY MEDICINE CLINIC | Age: 69
End: 2024-05-28
Payer: MEDICARE

## 2024-05-28 VITALS
SYSTOLIC BLOOD PRESSURE: 138 MMHG | WEIGHT: 191.2 LBS | DIASTOLIC BLOOD PRESSURE: 82 MMHG | BODY MASS INDEX: 30.01 KG/M2 | RESPIRATION RATE: 16 BRPM | HEART RATE: 73 BPM | HEIGHT: 67 IN | OXYGEN SATURATION: 95 %

## 2024-05-28 DIAGNOSIS — Z00.00 INITIAL MEDICARE ANNUAL WELLNESS VISIT: Primary | ICD-10-CM

## 2024-05-28 DIAGNOSIS — E11.9 TYPE 2 DIABETES MELLITUS WITHOUT COMPLICATION, WITHOUT LONG-TERM CURRENT USE OF INSULIN (HCC): ICD-10-CM

## 2024-05-28 DIAGNOSIS — I10 HYPERTENSION, UNSPECIFIED TYPE: ICD-10-CM

## 2024-05-28 DIAGNOSIS — E78.5 HYPERLIPIDEMIA, UNSPECIFIED HYPERLIPIDEMIA TYPE: ICD-10-CM

## 2024-05-28 LAB
CREATININE URINE POCT: 50
HBA1C MFR BLD: 8 %
MICROALBUMIN/CREAT 24H UR: 30 MG/DL
MICROALBUMIN/CREAT UR-RTO: ABNORMAL MG/G

## 2024-05-28 PROCEDURE — G0439 PPPS, SUBSEQ VISIT: HCPCS | Performed by: NURSE PRACTITIONER

## 2024-05-28 PROCEDURE — 3079F DIAST BP 80-89 MM HG: CPT | Performed by: NURSE PRACTITIONER

## 2024-05-28 PROCEDURE — 3017F COLORECTAL CA SCREEN DOC REV: CPT | Performed by: NURSE PRACTITIONER

## 2024-05-28 PROCEDURE — 3052F HG A1C>EQUAL 8.0%<EQUAL 9.0%: CPT | Performed by: NURSE PRACTITIONER

## 2024-05-28 PROCEDURE — 83036 HEMOGLOBIN GLYCOSYLATED A1C: CPT | Performed by: NURSE PRACTITIONER

## 2024-05-28 PROCEDURE — 82044 UR ALBUMIN SEMIQUANTITATIVE: CPT | Performed by: NURSE PRACTITIONER

## 2024-05-28 PROCEDURE — 3075F SYST BP GE 130 - 139MM HG: CPT | Performed by: NURSE PRACTITIONER

## 2024-05-28 PROCEDURE — 1123F ACP DISCUSS/DSCN MKR DOCD: CPT | Performed by: NURSE PRACTITIONER

## 2024-05-28 RX ORDER — SENNOSIDES 8.6 MG
1300 CAPSULE ORAL EVERY 8 HOURS PRN
COMMUNITY

## 2024-05-28 SDOH — ECONOMIC STABILITY: FOOD INSECURITY: WITHIN THE PAST 12 MONTHS, YOU WORRIED THAT YOUR FOOD WOULD RUN OUT BEFORE YOU GOT MONEY TO BUY MORE.: NEVER TRUE

## 2024-05-28 SDOH — ECONOMIC STABILITY: FOOD INSECURITY: WITHIN THE PAST 12 MONTHS, THE FOOD YOU BOUGHT JUST DIDN'T LAST AND YOU DIDN'T HAVE MONEY TO GET MORE.: NEVER TRUE

## 2024-05-28 SDOH — ECONOMIC STABILITY: INCOME INSECURITY: HOW HARD IS IT FOR YOU TO PAY FOR THE VERY BASICS LIKE FOOD, HOUSING, MEDICAL CARE, AND HEATING?: NOT HARD AT ALL

## 2024-05-28 ASSESSMENT — LIFESTYLE VARIABLES
HOW OFTEN DO YOU HAVE A DRINK CONTAINING ALCOHOL: NEVER
HOW MANY STANDARD DRINKS CONTAINING ALCOHOL DO YOU HAVE ON A TYPICAL DAY: PATIENT DOES NOT DRINK

## 2024-05-28 ASSESSMENT — PATIENT HEALTH QUESTIONNAIRE - PHQ9
SUM OF ALL RESPONSES TO PHQ QUESTIONS 1-9: 0
1. LITTLE INTEREST OR PLEASURE IN DOING THINGS: NOT AT ALL
SUM OF ALL RESPONSES TO PHQ9 QUESTIONS 1 & 2: 0
SUM OF ALL RESPONSES TO PHQ QUESTIONS 1-9: 0
2. FEELING DOWN, DEPRESSED OR HOPELESS: NOT AT ALL

## 2024-05-28 NOTE — PATIENT INSTRUCTIONS
and other mental skills have changed.  You may get blood tests and tests that look at your brain.  These questions and tests can make sure you don't have other conditions that can cause symptoms like MCI. These include depression, sleep problems, and side effects from medicines.  How is it treated?  There are no medicines to treat MCI or to keep it from progressing to dementia. But treating conditions like high blood pressure and diabetes may help. A person with MCI needs routine follow-up visits with their doctor to check on changes in the person's mental skills.  How can you care for yourself at home?  Keeping your body active can help slow MCI. Exercises like walking can help. Try to stay active mentally too. Read or do things like crossword puzzles if you enjoy doing them.  If you need help coping with MCI, you may want to get support from family, friends, a support group, or a counselor who works with people who have MCI.  Though the future isn't always clear, it can be good to plan ahead with instructions for your care. These are called advanced directives. Having a plan can help make sure that you get the care you want.  Current as of: December 20, 2023               Content Version: 14.0  © 2006-2024 [employer].   Care instructions adapted under license by [employer] If you have questions about a medical condition or this instruction, always ask your healthcare professional. [employer] disclaims any warranty or liability for your use of this information.           Learning About Dental Care for Older Adults  Dental care for older adults: Overview  Dental care for older people is much the same as for younger adults. But older adults do have concerns that younger adults do not. Older adults may have problems with gum disease and decay on the roots of their teeth. They may need missing teeth replaced or broken fillings fixed. Or they may have dentures that need to be cared for.

## 2024-05-28 NOTE — PROGRESS NOTES
Health Maintenance Due   Topic Date Due    Respiratory Syncytial Virus (RSV) Pregnant or age 60 yrs+ (1 - 1-dose 60+ series) Never done    Diabetic Alb to Cr ratio (uACR) test  04/12/2024    Annual Wellness Visit (Medicare)  04/19/2024

## 2024-05-28 NOTE — PROGRESS NOTES
Medicare Annual Wellness Visit    Deven Benson is here for Medicare AWV and 3 Month Follow-Up (DM)    Assessment & Plan   Initial Medicare annual wellness visit  Type 2 diabetes mellitus without complication, without long-term current use of insulin (HCC)  A1C 8.0% , previously 6.9%. Has been eating a lot of sweet bread, he has been making \"friendship bread\"   Farxiga 10 mg   Metformin 1000 mg BID  Trulicity 4.5 mg   -     POCT glycosylated hemoglobin (Hb A1C)  -     POCT microalbumin  Hypertension, unspecified type  Controlled. 138/82  Continue current medications.   Patient reminded and encouraged to make the necessary lifestyle modifications if appropriate: Weight loss, DASH diet, Reduce sodium, Increase physical activity, Moderate Alcohol consumption.   Hyperlipidemia, unspecified hyperlipidemia type  Lifestyle recommendations were encouraged.  Patient to continue simvastatin 20 mg nightly.               Recommendations for Preventive Services Due: see orders and patient instructions/AVS.  Recommended screening schedule for the next 5-10 years is provided to the patient in written form: see Patient Instructions/AVS.     Return in 3 months (on 8/28/2024).     Subjective   The following acute and/or chronic problems were also addressed today:      Patient's complete Health Risk Assessment and screening values have been reviewed and are found in Flowsheets. The following problems were reviewed today and where indicated follow up appointments were made and/or referrals ordered.    Positive Risk Factor Screenings with Interventions:    Fall Risk:  Do you feel unsteady or are you worried about falling? : (!) yes  2 or more falls in past year?: no  Fall with injury in past year?: (!) yes     Interventions:    Reviewed medications, home hazards, visual acuity, and co-morbidities that can increase risk for falls    Cognitive:   Clock Drawing Test (CDT): (!) Abnormal  Words recalled: 2 Words Recalled  Total Score: (!)

## 2024-06-03 ENCOUNTER — TELEPHONE (OUTPATIENT)
Dept: FAMILY MEDICINE CLINIC | Age: 69
End: 2024-06-03

## 2024-06-03 DIAGNOSIS — E11.9 TYPE 2 DIABETES MELLITUS WITHOUT COMPLICATION, WITHOUT LONG-TERM CURRENT USE OF INSULIN (HCC): ICD-10-CM

## 2024-06-03 RX ORDER — GLUCOSAMINE HCL/CHONDROITIN SU 500-400 MG
CAPSULE ORAL
Qty: 400 STRIP | Refills: 3 | Status: SHIPPED | OUTPATIENT
Start: 2024-06-03

## 2024-06-03 NOTE — TELEPHONE ENCOUNTER
Pt is calling stating his insurance is telling him they will not fill his Trulicity without a PA. Last PA documentation states a PA is not needed for this med, however pt states they refuse to fill until PA is done.     Patient's last appointment was : 5/28/2024  Patient's next appointment is : 8/28/2024  Last refilled:

## 2024-06-03 NOTE — TELEPHONE ENCOUNTER
Pt is asking for a refill for the [employer] smart check 400 count     [facility] to [facility]    Patient's last appointment was : 5/28/2024  Patient's next appointment is : 8/28/2024  Last refilled:

## 2024-06-04 ENCOUNTER — TELEPHONE (OUTPATIENT)
Dept: RHEUMATOLOGY | Age: 69
End: 2024-06-04

## 2024-06-04 NOTE — TELEPHONE ENCOUNTER
Please inform pt that his blood work overall looks good. Nothing to suggest an underlying inflammatory systemic rheumatologic condition at this time.

## 2024-06-11 ENCOUNTER — TELEPHONE (OUTPATIENT)
Dept: FAMILY MEDICINE CLINIC | Age: 69
End: 2024-06-11

## 2024-06-11 NOTE — TELEPHONE ENCOUNTER
Patient brought in sugar readings and weight  from 5/29/24-6/11/24    Also patient states that he only has 2 doses left of his Trulicity- states that his mail in pharmacy has this on back order- is not wanting to get at any local pharmacy as it will cost him just as much for a 1 month supply as it does a 3 month supply.

## 2024-06-12 NOTE — TELEPHONE ENCOUNTER
I am assuming the second number in the log is the time the glucose was checked at?    Regarding Trulicity, I understand the frustration with mail order, BUT he cannot stop taking the medication, we are working hard to get his A1C down and taking the Trulicity is a major factor in this.

## 2024-06-19 DIAGNOSIS — E11.9 TYPE 2 DIABETES MELLITUS WITHOUT COMPLICATION, WITHOUT LONG-TERM CURRENT USE OF INSULIN (HCC): Primary | ICD-10-CM

## 2024-06-19 RX ORDER — BLOOD-GLUCOSE METER
1 KIT MISCELLANEOUS DAILY
Qty: 1 KIT | Refills: 0 | Status: SHIPPED | OUTPATIENT
Start: 2024-06-19

## 2024-06-19 NOTE — TELEPHONE ENCOUNTER
Patient calling in and states that mail in pharmacy sent him strips that do not fit his meter. Patient advised that pharmacy will only dispense strips that are covered. He states that now he needs a one touch verio flex meter sent to mail in

## 2024-07-09 ENCOUNTER — TELEPHONE (OUTPATIENT)
Dept: FAMILY MEDICINE CLINIC | Age: 69
End: 2024-07-09

## 2024-07-09 NOTE — TELEPHONE ENCOUNTER
Patient dropped off blood sugar readings:    6/26/24:  125  27: 129  28: 120  29: 144  30 : 133  7/1: 113  7/2:120  7/3: 119  7/4:147  7/5: 144  7/6: 121  7/7 118  7/8: 136  7/9: 126

## 2024-07-10 NOTE — TELEPHONE ENCOUNTER
Ok, those are looking good.   Did he get his Trulicity? I know he was having some pharmacy issues.

## 2024-07-11 RX ORDER — BLOOD-GLUCOSE METER
1 KIT MISCELLANEOUS DAILY
Qty: 1 KIT | Refills: 0 | Status: SHIPPED | OUTPATIENT
Start: 2024-07-11

## 2024-07-11 NOTE — TELEPHONE ENCOUNTER
Spoke with [employer] company- [phone]- they state that they do not have any record of this patient in their system and that the only way they would be shipping him a machine was if it was approved by insurance and paid for.     Spoke with tracie - they state that the patients plan does not cover ANY glucometer's only test strips and lancets. The only test strips that they cover are one touch verio and contour.     Spoke with patient- he was given one touch verio test strips    Printed coupon for patient- need script for glucometer to go with coupon . Once script is printed call patient and he will  -      Coupon in my box

## 2024-07-11 NOTE — TELEPHONE ENCOUNTER
Patient called back and he did get his Trulicity. He voiced understanding that DAVID Navarro was happy with his blood sugar readings.  He did voice concern that he is still waiting for his glucometer.

## 2024-08-10 ENCOUNTER — OFFICE VISIT (OUTPATIENT)
Age: 69
End: 2024-08-10

## 2024-08-10 DIAGNOSIS — Z00.8 ENCOUNTER FOR BIOMETRIC SCREENING: Primary | ICD-10-CM

## 2024-08-12 ENCOUNTER — TELEPHONE (OUTPATIENT)
Age: 69
End: 2024-08-12

## 2024-08-12 VITALS — SYSTOLIC BLOOD PRESSURE: 128 MMHG | DIASTOLIC BLOOD PRESSURE: 78 MMHG

## 2024-08-12 DIAGNOSIS — J30.2 SEASONAL ALLERGIES: ICD-10-CM

## 2024-08-12 DIAGNOSIS — E78.5 HYPERLIPIDEMIA, UNSPECIFIED HYPERLIPIDEMIA TYPE: ICD-10-CM

## 2024-08-12 DIAGNOSIS — I10 HYPERTENSION, UNSPECIFIED TYPE: ICD-10-CM

## 2024-08-12 DIAGNOSIS — E11.9 TYPE 2 DIABETES MELLITUS WITHOUT COMPLICATION, WITHOUT LONG-TERM CURRENT USE OF INSULIN (HCC): ICD-10-CM

## 2024-08-12 LAB
CHOLESTEROL: 0 MG/DL
CHP ED QC CHECK: ABNORMAL
GLUCOSE BLD-MCNC: 135 MG/DL
HIGH DENSITY CHOLESTEROL: 43 MG/DL
NICOTINE: NEGATIVE
TRIGL SERPL-MCNC: 141 MG/DL

## 2024-08-12 RX ORDER — LOSARTAN POTASSIUM 50 MG/1
50 TABLET ORAL DAILY
Qty: 90 TABLET | Refills: 0 | Status: SHIPPED | OUTPATIENT
Start: 2024-08-12 | End: 2024-11-10

## 2024-08-12 RX ORDER — CETIRIZINE HYDROCHLORIDE 10 MG/1
10 TABLET ORAL DAILY
Qty: 90 TABLET | Refills: 0 | Status: SHIPPED | OUTPATIENT
Start: 2024-08-12 | End: 2024-11-10

## 2024-08-12 RX ORDER — SIMVASTATIN 20 MG
20 TABLET ORAL NIGHTLY
Qty: 90 TABLET | Refills: 0 | Status: SHIPPED | OUTPATIENT
Start: 2024-08-12 | End: 2024-11-10

## 2024-08-21 ENCOUNTER — TELEPHONE (OUTPATIENT)
Dept: FAMILY MEDICINE CLINIC | Age: 69
End: 2024-08-21

## 2024-08-22 NOTE — TELEPHONE ENCOUNTER
Numbers look good, please call and confirm with Deven what all medications and dosing he is taking.   Thank you

## 2024-08-26 NOTE — TELEPHONE ENCOUNTER
Medication list clarified with patient. All medications correct, except, patient is not taking Valsartan (Diovan). Flagged medication to be discontinued from list.       Patient will drop off blood sugar readings later this week, as he is leaving for vacation on Saturday.

## 2024-08-27 ENCOUNTER — OFFICE VISIT (OUTPATIENT)
Age: 69
End: 2024-08-27

## 2024-08-27 DIAGNOSIS — E11.9 TYPE 2 DIABETES MELLITUS WITHOUT COMPLICATION, WITHOUT LONG-TERM CURRENT USE OF INSULIN (HCC): Primary | ICD-10-CM

## 2024-08-27 LAB — HBA1C MFR BLD: 7.1 %

## 2024-08-30 ENCOUNTER — TELEPHONE (OUTPATIENT)
Dept: FAMILY MEDICINE CLINIC | Age: 69
End: 2024-08-30

## 2024-08-30 NOTE — TELEPHONE ENCOUNTER
Patient is due for his DM appointment.   Please schedule this, unless he is following with the Provider at his wifes workplace.

## 2024-09-03 NOTE — TELEPHONE ENCOUNTER
Patient is on vacation and is not at home to look at his schedule. He will call to schedule an appointment when he gets back.

## 2024-09-16 LAB — DIABETIC RETINOPATHY: POSITIVE

## 2024-09-23 ENCOUNTER — TELEPHONE (OUTPATIENT)
Dept: FAMILY MEDICINE CLINIC | Age: 69
End: 2024-09-23

## 2024-09-24 ENCOUNTER — OFFICE VISIT (OUTPATIENT)
Dept: FAMILY MEDICINE CLINIC | Age: 69
End: 2024-09-24
Payer: MEDICARE

## 2024-09-24 VITALS
HEIGHT: 67 IN | OXYGEN SATURATION: 98 % | WEIGHT: 184.4 LBS | RESPIRATION RATE: 16 BRPM | SYSTOLIC BLOOD PRESSURE: 142 MMHG | DIASTOLIC BLOOD PRESSURE: 82 MMHG | BODY MASS INDEX: 28.94 KG/M2 | HEART RATE: 68 BPM | TEMPERATURE: 98 F

## 2024-09-24 DIAGNOSIS — R41.89 BRAIN FOG: ICD-10-CM

## 2024-09-24 DIAGNOSIS — E78.5 HYPERLIPIDEMIA, UNSPECIFIED HYPERLIPIDEMIA TYPE: ICD-10-CM

## 2024-09-24 DIAGNOSIS — E55.9 VITAMIN D DEFICIENCY, UNSPECIFIED: ICD-10-CM

## 2024-09-24 DIAGNOSIS — E11.9 TYPE 2 DIABETES MELLITUS WITHOUT COMPLICATION, WITHOUT LONG-TERM CURRENT USE OF INSULIN (HCC): Primary | ICD-10-CM

## 2024-09-24 PROCEDURE — G8427 DOCREV CUR MEDS BY ELIG CLIN: HCPCS | Performed by: NURSE PRACTITIONER

## 2024-09-24 PROCEDURE — 1036F TOBACCO NON-USER: CPT | Performed by: NURSE PRACTITIONER

## 2024-09-24 PROCEDURE — 3079F DIAST BP 80-89 MM HG: CPT | Performed by: NURSE PRACTITIONER

## 2024-09-24 PROCEDURE — 3077F SYST BP >= 140 MM HG: CPT | Performed by: NURSE PRACTITIONER

## 2024-09-24 PROCEDURE — 1123F ACP DISCUSS/DSCN MKR DOCD: CPT | Performed by: NURSE PRACTITIONER

## 2024-09-24 PROCEDURE — 99214 OFFICE O/P EST MOD 30 MIN: CPT | Performed by: NURSE PRACTITIONER

## 2024-09-24 PROCEDURE — 3051F HG A1C>EQUAL 7.0%<8.0%: CPT | Performed by: NURSE PRACTITIONER

## 2024-09-24 PROCEDURE — 3017F COLORECTAL CA SCREEN DOC REV: CPT | Performed by: NURSE PRACTITIONER

## 2024-09-24 PROCEDURE — G8417 CALC BMI ABV UP PARAM F/U: HCPCS | Performed by: NURSE PRACTITIONER

## 2024-09-24 PROCEDURE — 2022F DILAT RTA XM EVC RTNOPTHY: CPT | Performed by: NURSE PRACTITIONER

## 2024-09-24 ASSESSMENT — ENCOUNTER SYMPTOMS
ABDOMINAL PAIN: 0
SINUS PAIN: 0
FACIAL SWELLING: 0
EYE ITCHING: 0
SINUS PRESSURE: 0
VOMITING: 0
EYE DISCHARGE: 0
SHORTNESS OF BREATH: 0
RECTAL PAIN: 0
SORE THROAT: 0
EYE PAIN: 0
COLOR CHANGE: 0
NAUSEA: 0
BACK PAIN: 0
COUGH: 0
EYE REDNESS: 0
CONSTIPATION: 0
ABDOMINAL DISTENTION: 0
BLOOD IN STOOL: 0
TROUBLE SWALLOWING: 0
DIARRHEA: 0
CHEST TIGHTNESS: 0
WHEEZING: 0

## 2024-10-11 DIAGNOSIS — E78.5 HYPERLIPIDEMIA, UNSPECIFIED HYPERLIPIDEMIA TYPE: Primary | ICD-10-CM

## 2024-10-15 ENCOUNTER — TELEPHONE (OUTPATIENT)
Dept: FAMILY MEDICINE CLINIC | Age: 69
End: 2024-10-15

## 2024-10-15 NOTE — TELEPHONE ENCOUNTER
Please see blood sugar readings scanned in on 10/03/2024 and today, 10/15/2024.     Patient would like a call back about his numbers.     Thank you.

## 2024-10-17 ENCOUNTER — OFFICE VISIT (OUTPATIENT)
Age: 69
End: 2024-10-17

## 2024-10-17 DIAGNOSIS — E11.9 TYPE 2 DIABETES MELLITUS WITHOUT COMPLICATION, WITHOUT LONG-TERM CURRENT USE OF INSULIN (HCC): ICD-10-CM

## 2024-10-17 DIAGNOSIS — I10 HYPERTENSION, UNSPECIFIED TYPE: ICD-10-CM

## 2024-10-17 DIAGNOSIS — Z76.0 MEDICATION REFILL: Primary | ICD-10-CM

## 2024-10-17 DIAGNOSIS — E78.5 HYPERLIPIDEMIA, UNSPECIFIED HYPERLIPIDEMIA TYPE: ICD-10-CM

## 2024-10-17 DIAGNOSIS — J30.2 SEASONAL ALLERGIES: ICD-10-CM

## 2024-10-17 RX ORDER — LOSARTAN POTASSIUM 50 MG/1
50 TABLET ORAL DAILY
Qty: 90 TABLET | Refills: 0 | Status: SHIPPED | OUTPATIENT
Start: 2024-10-17 | End: 2025-01-15

## 2024-10-17 RX ORDER — SIMVASTATIN 20 MG
20 TABLET ORAL NIGHTLY
Qty: 90 TABLET | Refills: 0 | Status: SHIPPED | OUTPATIENT
Start: 2024-10-17 | End: 2025-01-15

## 2024-10-17 RX ORDER — CETIRIZINE HYDROCHLORIDE 10 MG/1
10 TABLET ORAL DAILY
Qty: 90 TABLET | Refills: 0 | Status: SHIPPED | OUTPATIENT
Start: 2024-10-17 | End: 2025-01-15

## 2024-11-27 ENCOUNTER — OFFICE VISIT (OUTPATIENT)
Dept: FAMILY MEDICINE CLINIC | Age: 69
End: 2024-11-27

## 2024-11-27 VITALS
RESPIRATION RATE: 16 BRPM | OXYGEN SATURATION: 97 % | BODY MASS INDEX: 29.19 KG/M2 | HEART RATE: 72 BPM | HEIGHT: 67 IN | SYSTOLIC BLOOD PRESSURE: 124 MMHG | DIASTOLIC BLOOD PRESSURE: 76 MMHG | WEIGHT: 186 LBS

## 2024-11-27 DIAGNOSIS — R41.3 MEMORY CHANGES: ICD-10-CM

## 2024-11-27 DIAGNOSIS — G89.29 CHRONIC PAIN OF BOTH KNEES: ICD-10-CM

## 2024-11-27 DIAGNOSIS — M25.562 CHRONIC PAIN OF BOTH KNEES: ICD-10-CM

## 2024-11-27 DIAGNOSIS — I10 HYPERTENSION, UNSPECIFIED TYPE: ICD-10-CM

## 2024-11-27 DIAGNOSIS — Z23 NEED FOR INFLUENZA VACCINATION: ICD-10-CM

## 2024-11-27 DIAGNOSIS — E78.2 MIXED HYPERLIPIDEMIA: ICD-10-CM

## 2024-11-27 DIAGNOSIS — E11.9 TYPE 2 DIABETES MELLITUS WITHOUT COMPLICATION, WITHOUT LONG-TERM CURRENT USE OF INSULIN (HCC): Primary | ICD-10-CM

## 2024-11-27 DIAGNOSIS — M25.561 CHRONIC PAIN OF BOTH KNEES: ICD-10-CM

## 2024-11-27 PROBLEM — E66.09 CLASS 1 OBESITY DUE TO EXCESS CALORIES WITH SERIOUS COMORBIDITY AND BODY MASS INDEX (BMI) OF 32.0 TO 32.9 IN ADULT: Status: RESOLVED | Noted: 2022-04-20 | Resolved: 2024-11-27

## 2024-11-27 PROBLEM — E66.811 CLASS 1 OBESITY DUE TO EXCESS CALORIES WITH SERIOUS COMORBIDITY AND BODY MASS INDEX (BMI) OF 32.0 TO 32.9 IN ADULT: Status: RESOLVED | Noted: 2022-04-20 | Resolved: 2024-11-27

## 2024-11-27 LAB — HBA1C MFR BLD: 8.2 %

## 2024-11-27 RX ORDER — DAPAGLIFLOZIN 10 MG/1
10 TABLET, FILM COATED ORAL EVERY MORNING
Qty: 90 TABLET | Status: CANCELLED | OUTPATIENT
Start: 2024-11-27 | End: 2025-11-27

## 2024-11-27 ASSESSMENT — ENCOUNTER SYMPTOMS
WHEEZING: 0
TROUBLE SWALLOWING: 0
EYE PAIN: 0
BLOOD IN STOOL: 0
ABDOMINAL PAIN: 0
COUGH: 0
VOMITING: 0
SORE THROAT: 0
SINUS PAIN: 0
ABDOMINAL DISTENTION: 0
CHEST TIGHTNESS: 0
SHORTNESS OF BREATH: 0
COLOR CHANGE: 0
NAUSEA: 0
EYE ITCHING: 0
FACIAL SWELLING: 0
DIARRHEA: 0
BACK PAIN: 0
EYE REDNESS: 0
CONSTIPATION: 0
EYE DISCHARGE: 0
SINUS PRESSURE: 0
RECTAL PAIN: 0

## 2024-11-27 NOTE — PROGRESS NOTES
Health Maintenance Due   Topic Date Due    Flu vaccine (1) 08/01/2024    COVID-19 Vaccine (4 - 2023-24 season) 09/01/2024    Diabetic foot exam  11/15/2024      Patient plans to get flu vaccine through his wife's health center.    The catheter was inserted into the, was removed from the and was inserted over the wire into the left ventricle. Hemodynamics were performed.  and Pullback was recorded.

## 2024-11-27 NOTE — PROGRESS NOTES
[facility]  [facility]  [address] [address]  Dept: [phone]  Loc: [phone]    Deven Benson (:  1955) is a 68 y.o. male,Established patient, here for evaluation of the following chief complaint(s):  Follow-up (Patient presents with wife for f/u of chronic conditions. /A1C = 7.1% on 2024. /Patient would like to discuss Co Q10 and arthritic gummies, as he reports his arthritis is much worse. /)      ASSESSMENT/PLAN:  1. Type 2 diabetes mellitus without complication, without long-term current use of insulin (HCC)  Hemoglobin A1C   Date Value Ref Range Status   2024 8.2 % Final     Previous A1C 7.1%   Trulicity 4.5 mg weekly, Farxiga 10 mg, metformin 1000 mg BID  Patient admits to poor diet choices.   Has been eating a lot candy  Several apples a day   Bagels daily  Pasta several days a week  He was referred to DM educator in the past, went a couple times, but declines going again.   Discussed at length the need to reduce sugar and card intake.   He voices understanding.      -     POCT glycosylated hemoglobin (Hb A1C)  -      DIABETES FOOT EXAM  2. Hypertension, unspecified type  Controlled. 124/76  Continue Losartan 50 mg daily.   Patient reminded and encouraged to make the necessary lifestyle modifications if appropriate: Weight loss, DASH diet, Reduce sodium, Increase physical activity, Moderate Alcohol consumption.     3. Mixed hyperlipidemia  Continue simvastatin 20 mg     Lab Results   Component Value Date    CHOL 137 10/22/2024    TRIG 91 10/22/2024    HDL 51 10/22/2024    LDL 68 10/22/2024    VLDL 34 2020    CHOLHDLRATIO 3.8 2023      4. Chronic pain of both knees  -     diclofenac sodium (VOLTAREN) 1 % GEL; Apply 4 g topically 4 times daily, Topical, 4 TIMES DAILY Starting Wed 2024, Until Thu 2025, For 365 days, Disp-350 g, R-11, Print  5. Need for influenza vaccination  -

## 2024-11-27 NOTE — PROGRESS NOTES
After obtaining consent, and per orders of DAVID Elizondo, injection of FLUAD given in Right deltoid by Maribel Soria LPN. Patient instructed to remain in clinic for 20 minutes afterwards, and to report any adverse reaction to me immediately.    Immunizations Administered       Name Date Dose Route    Influenza, FLUAD, (age 65 y+), IM, Trivalent PF, 0.5mL 11/27/2024 0.5 mL Intramuscular    Site: Deltoid- Right    Lot: [id number]    NDC: 22957-601-24        Vaccination checklist completed. VIS given. Patient tolerated well.

## 2024-11-28 DIAGNOSIS — E11.9 TYPE 2 DIABETES MELLITUS WITHOUT COMPLICATION, WITHOUT LONG-TERM CURRENT USE OF INSULIN (HCC): ICD-10-CM

## 2024-12-02 RX ORDER — DULAGLUTIDE 4.5 MG/.5ML
4.5 INJECTION, SOLUTION SUBCUTANEOUS
Qty: 6 ML | Refills: 3 | Status: SHIPPED | OUTPATIENT
Start: 2024-12-02 | End: 2025-12-02

## 2024-12-04 ENCOUNTER — TELEPHONE (OUTPATIENT)
Dept: FAMILY MEDICINE CLINIC | Age: 69
End: 2024-12-04

## 2024-12-08 DIAGNOSIS — E11.9 TYPE 2 DIABETES MELLITUS WITHOUT COMPLICATION, WITHOUT LONG-TERM CURRENT USE OF INSULIN (HCC): ICD-10-CM

## 2024-12-09 RX ORDER — DAPAGLIFLOZIN 10 MG/1
10 TABLET, FILM COATED ORAL EVERY MORNING
Qty: 90 TABLET | Refills: 3 | Status: SHIPPED | OUTPATIENT
Start: 2024-12-09 | End: 2025-12-09

## 2024-12-09 NOTE — TELEPHONE ENCOUNTER
Patient's last appointment was : 11/27/2024  Patient's next appointment is : 3/4/2025  Last refilled: 03/01/24 90 tab, 3 refill

## 2025-01-20 ENCOUNTER — TELEPHONE (OUTPATIENT)
Dept: FAMILY MEDICINE CLINIC | Age: 70
End: 2025-01-20

## 2025-01-20 NOTE — TELEPHONE ENCOUNTER
Type Date User Summary Attachment   General 12/03/2024  1:41 PM Becca Hughes MA note -   Note:  Referral has been reviewed by Paige Leopold CNP.  Currently not accepting memory patients at this time.   Higher level -[facility] or [facility].

## 2025-01-20 NOTE — TELEPHONE ENCOUNTER
Talia Tapia, APRN - CNP sent to [facility] Clinical Staff  Please see message from Neurology...  We will need to ask patient who he would like to see? I believe Dr. Shannon with Neurology is still in [place].    HALIMA Guadarrama CNP

## 2025-01-20 NOTE — TELEPHONE ENCOUNTER
Spoke with patient- he is going to check with his insurance to see who is in network and give us a call back

## 2025-02-18 DIAGNOSIS — J30.89 SEASONAL ALLERGIC RHINITIS DUE TO OTHER ALLERGIC TRIGGER: ICD-10-CM

## 2025-02-18 DIAGNOSIS — E78.5 HYPERLIPIDEMIA, UNSPECIFIED HYPERLIPIDEMIA TYPE: ICD-10-CM

## 2025-02-18 DIAGNOSIS — Z76.0 MEDICATION REFILL: Primary | ICD-10-CM

## 2025-02-18 DIAGNOSIS — I10 HYPERTENSION, UNSPECIFIED TYPE: ICD-10-CM

## 2025-02-18 DIAGNOSIS — E11.9 TYPE 2 DIABETES MELLITUS WITHOUT COMPLICATION, WITHOUT LONG-TERM CURRENT USE OF INSULIN (HCC): ICD-10-CM

## 2025-02-18 RX ORDER — SIMVASTATIN 20 MG
20 TABLET ORAL NIGHTLY
Qty: 90 TABLET | Refills: 0 | Status: SHIPPED | OUTPATIENT
Start: 2025-02-18 | End: 2025-05-19

## 2025-02-18 RX ORDER — CETIRIZINE HYDROCHLORIDE 10 MG/1
10 TABLET ORAL DAILY
Qty: 90 TABLET | Refills: 0 | Status: SHIPPED | OUTPATIENT
Start: 2025-02-18

## 2025-02-18 RX ORDER — LOSARTAN POTASSIUM 50 MG/1
50 TABLET ORAL DAILY
Qty: 90 TABLET | Refills: 0 | Status: SHIPPED | OUTPATIENT
Start: 2025-02-18 | End: 2025-05-19

## 2025-02-18 NOTE — TELEPHONE ENCOUNTER
Patient is also requesting his allergy medicine refilled, it is 10 mg. Patient is also requesting Simvastatin 40 mg daily.    Thanks!

## 2025-03-14 ENCOUNTER — TELEPHONE (OUTPATIENT)
Dept: FAMILY MEDICINE CLINIC | Age: 70
End: 2025-03-14

## 2025-03-14 NOTE — TELEPHONE ENCOUNTER
I placed a referral to Neurology in the fall and it looks like they reviewed and did not accept the referral.   Can we check with patient and see if he was seen by another neurologist? Is a new referral needed?

## 2025-03-14 NOTE — TELEPHONE ENCOUNTER
Pt states that he is going to be seeing Dr. Shannon in [place]. Pt states he is going to be reaching out to his daughter about appt information and will give office a call back.

## 2025-03-17 ENCOUNTER — TELEPHONE (OUTPATIENT)
Dept: FAMILY MEDICINE CLINIC | Age: 70
End: 2025-03-17

## 2025-03-17 DIAGNOSIS — R41.3 MEMORY CHANGES: Primary | ICD-10-CM

## 2025-03-17 DIAGNOSIS — E11.9 TYPE 2 DIABETES MELLITUS WITHOUT COMPLICATION, WITHOUT LONG-TERM CURRENT USE OF INSULIN: ICD-10-CM

## 2025-03-26 ENCOUNTER — OFFICE VISIT (OUTPATIENT)
Age: 70
End: 2025-03-26

## 2025-03-26 DIAGNOSIS — E11.9 TYPE 2 DIABETES MELLITUS WITHOUT COMPLICATION, WITHOUT LONG-TERM CURRENT USE OF INSULIN: Primary | ICD-10-CM

## 2025-03-26 LAB — HBA1C MFR BLD: 7.6 %

## 2025-04-01 ENCOUNTER — OFFICE VISIT (OUTPATIENT)
Dept: FAMILY MEDICINE CLINIC | Age: 70
End: 2025-04-01

## 2025-04-01 VITALS
SYSTOLIC BLOOD PRESSURE: 148 MMHG | WEIGHT: 189.8 LBS | HEIGHT: 67 IN | HEART RATE: 64 BPM | BODY MASS INDEX: 29.79 KG/M2 | DIASTOLIC BLOOD PRESSURE: 84 MMHG | OXYGEN SATURATION: 99 % | RESPIRATION RATE: 16 BRPM

## 2025-04-01 DIAGNOSIS — I10 HYPERTENSION, UNSPECIFIED TYPE: ICD-10-CM

## 2025-04-01 DIAGNOSIS — E78.2 MIXED HYPERLIPIDEMIA: ICD-10-CM

## 2025-04-01 DIAGNOSIS — E11.9 TYPE 2 DIABETES MELLITUS WITHOUT COMPLICATION, WITHOUT LONG-TERM CURRENT USE OF INSULIN: Primary | ICD-10-CM

## 2025-04-01 DIAGNOSIS — R41.3 MEMORY CHANGES: ICD-10-CM

## 2025-04-01 SDOH — ECONOMIC STABILITY: FOOD INSECURITY: WITHIN THE PAST 12 MONTHS, THE FOOD YOU BOUGHT JUST DIDN'T LAST AND YOU DIDN'T HAVE MONEY TO GET MORE.: NEVER TRUE

## 2025-04-01 SDOH — ECONOMIC STABILITY: FOOD INSECURITY: WITHIN THE PAST 12 MONTHS, YOU WORRIED THAT YOUR FOOD WOULD RUN OUT BEFORE YOU GOT MONEY TO BUY MORE.: NEVER TRUE

## 2025-04-01 ASSESSMENT — ENCOUNTER SYMPTOMS
SINUS PAIN: 0
WHEEZING: 0
EYE REDNESS: 0
CONSTIPATION: 0
NAUSEA: 0
ABDOMINAL DISTENTION: 0
TROUBLE SWALLOWING: 0
RECTAL PAIN: 0
DIARRHEA: 0
SINUS PRESSURE: 0
EYE PAIN: 0
ABDOMINAL PAIN: 0
SHORTNESS OF BREATH: 0
BLOOD IN STOOL: 0
FACIAL SWELLING: 0
CHEST TIGHTNESS: 0
VOMITING: 0
BACK PAIN: 0
COUGH: 0
EYE ITCHING: 0
SORE THROAT: 0
EYE DISCHARGE: 0
COLOR CHANGE: 0

## 2025-04-01 ASSESSMENT — PATIENT HEALTH QUESTIONNAIRE - PHQ9
1. LITTLE INTEREST OR PLEASURE IN DOING THINGS: NOT AT ALL
2. FEELING DOWN, DEPRESSED OR HOPELESS: NOT AT ALL
SUM OF ALL RESPONSES TO PHQ QUESTIONS 1-9: 0

## 2025-04-01 NOTE — PROGRESS NOTES
Health Maintenance Due   Topic Date Due    Respiratory Syncytial Virus (RSV) Pregnant or age 60 yrs+ (1 - Risk 60-74 years 1-dose series) Never done    COVID-19 Vaccine (4 - 2024-25 season) 09/01/2024    Colorectal Cancer Screen  12/29/2024    GFR test (Diabetes, CKD 3-4, OR last GFR 15-59)  02/21/2025        Patient is due for colonoscopy, last one was Dr. Mario, 12/2021 and due 12/2024, patient declining to call at this time.

## 2025-04-01 NOTE — PROGRESS NOTES
[facility] PROFESSIONAL SERVS  [facility]  [address] [facility] [address]  Dept: [phone]  Loc: [phone]    Deven Benson (:  1955) is a 69 y.o. male,Established patient, here for evaluation of the following chief complaint(s):  Diabetes (Patient presents for a 3 month f/u for DM. Patient had blood work by HALIMA Sanders through [facility] on 2025. A1C = 7.6%. /Patient denies concerns today. /He is scheduled to see Dr. Shannon-Neurology with [facility], but he is unsure of date. )      Assessment & Plan  1. Type 2 Diabetes Mellitus.  His A1c levels have shown improvement, currently at 7.6, down from 8.2 in 2024. He is advised to increase his protein intake to at least 150 g per day to prevent further muscle loss and potentially discontinue metformin in the future. He is currently on Trulicity 4.5 mg, Farxiga 10 mg, and metformin 1000 mg twice a day. A re-evaluation of his A1c levels will be conducted in 3 months to assess the impact of dietary and lifestyle modifications.    2. Hypertension.  His blood pressure readings were slightly elevated today at 148/84. He is currently taking losartan 50 mg daily. A blood pressure log will be provided for home monitoring over the next week before considering any medication adjustments. A CBC and BMP will be ordered to monitor his condition.    3. Memory Changes and Brain Fog.  Alert and oriented X3  He reports occasional issues with cognitive memory function and clarity. He is advised to keep his upcoming neurology appointment for further evaluation.    4. Hyperlipidemia.  He is advised to continue his current medication regimen and dietary modifications. A fasting lipid panel will be ordered to monitor his lipid levels.  Last lipid 10/2024  137/91/51/68    Follow-up  The patient will follow up in 3 months.        Return in about 3 months (around 2025).    SUBJECTIVE/OBJECTIVE:  History of Present

## 2025-04-08 ENCOUNTER — TELEPHONE (OUTPATIENT)
Dept: FAMILY MEDICINE CLINIC | Age: 70
End: 2025-04-08

## 2025-04-08 NOTE — TELEPHONE ENCOUNTER
Patient's wife dropped off his BP readings. She mentioned patient had a low BP on 04/07/2025 of 99/66 but did not having any dizziness or other issues.     Please review and advise.

## 2025-04-09 NOTE — TELEPHONE ENCOUNTER
Blood pressure readings reviewed and show much better control than when he was in the office last.  Continue current medications

## 2025-05-05 ENCOUNTER — TELEPHONE (OUTPATIENT)
Age: 70
End: 2025-05-05

## 2025-05-05 DIAGNOSIS — I10 HYPERTENSION, UNSPECIFIED TYPE: ICD-10-CM

## 2025-05-05 DIAGNOSIS — E11.9 TYPE 2 DIABETES MELLITUS WITHOUT COMPLICATION, WITHOUT LONG-TERM CURRENT USE OF INSULIN (HCC): ICD-10-CM

## 2025-05-05 DIAGNOSIS — J30.89 SEASONAL ALLERGIC RHINITIS DUE TO OTHER ALLERGIC TRIGGER: ICD-10-CM

## 2025-05-05 DIAGNOSIS — Z76.0 MEDICATION REFILL: Primary | ICD-10-CM

## 2025-05-05 DIAGNOSIS — E78.5 HYPERLIPIDEMIA, UNSPECIFIED HYPERLIPIDEMIA TYPE: ICD-10-CM

## 2025-05-05 RX ORDER — LOSARTAN POTASSIUM 50 MG/1
50 TABLET ORAL DAILY
Qty: 90 TABLET | Refills: 0 | Status: SHIPPED | OUTPATIENT
Start: 2025-05-05 | End: 2025-08-03

## 2025-05-05 RX ORDER — SIMVASTATIN 20 MG
20 TABLET ORAL NIGHTLY
Qty: 90 TABLET | Refills: 0 | Status: SHIPPED | OUTPATIENT
Start: 2025-05-05 | End: 2025-08-03

## 2025-05-05 RX ORDER — CETIRIZINE HYDROCHLORIDE 10 MG/1
10 TABLET ORAL DAILY
Qty: 90 TABLET | Refills: 0 | Status: SHIPPED | OUTPATIENT
Start: 2025-05-05

## 2025-05-17 ENCOUNTER — OFFICE VISIT (OUTPATIENT)
Age: 70
End: 2025-05-17

## 2025-05-17 VITALS — SYSTOLIC BLOOD PRESSURE: 129 MMHG | DIASTOLIC BLOOD PRESSURE: 81 MMHG

## 2025-05-17 DIAGNOSIS — Z00.8 ENCOUNTER FOR BIOMETRIC SCREENING: Primary | ICD-10-CM

## 2025-05-17 LAB
CHOLESTEROL/HDL RATIO: NORMAL
CHP ED QC CHECK: NORMAL
GLUCOSE BLD-MCNC: 125 MG/DL
HDLC SERPL-MCNC: 39 MG/DL (ref 35–70)
LDL CHOLESTEROL: NORMAL
NICOTINE: NEGATIVE
SUM TOTAL CHOLESTEROL: NORMAL
TRIGL SERPL-MCNC: 98 MG/DL
VLDLC SERPL CALC-MCNC: NORMAL MG/DL

## 2025-06-05 ENCOUNTER — TELEPHONE (OUTPATIENT)
Dept: FAMILY MEDICINE CLINIC | Age: 70
End: 2025-06-05

## 2025-06-17 ENCOUNTER — TELEPHONE (OUTPATIENT)
Dept: FAMILY MEDICINE CLINIC | Age: 70
End: 2025-06-17

## 2025-06-17 NOTE — TELEPHONE ENCOUNTER
MRI reviewed and I agree with recommendations of additional imaging. Further questions regarding the results will need to be discussed with the Neurologist.

## 2025-06-18 NOTE — TELEPHONE ENCOUNTER
See below message from yesterday with what I said regarding the MRI, and notify patient please.        MRI reviewed and I agree with recommendations of additional imaging. Further questions regarding the results will need to be discussed with the Neurologist.           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  Postcontrast imaging is recommended for more specific evaluation.

## 2025-06-18 NOTE — TELEPHONE ENCOUNTER
Patient called wanting to know what DAVID Elizondo had said about the repeat MRI, etc..    Patient would like a call back.

## 2025-07-03 LAB — GFR, ESTIMATED: 60

## 2025-07-09 ENCOUNTER — TELEPHONE (OUTPATIENT)
Dept: FAMILY MEDICINE CLINIC | Age: 70
End: 2025-07-09

## 2025-07-14 DIAGNOSIS — E78.5 HYPERLIPIDEMIA, UNSPECIFIED HYPERLIPIDEMIA TYPE: ICD-10-CM

## 2025-07-14 DIAGNOSIS — J30.89 SEASONAL ALLERGIC RHINITIS DUE TO OTHER ALLERGIC TRIGGER: ICD-10-CM

## 2025-07-14 DIAGNOSIS — I10 HYPERTENSION, UNSPECIFIED TYPE: ICD-10-CM

## 2025-07-14 DIAGNOSIS — E11.9 TYPE 2 DIABETES MELLITUS WITHOUT COMPLICATION, WITHOUT LONG-TERM CURRENT USE OF INSULIN (HCC): ICD-10-CM

## 2025-07-14 DIAGNOSIS — Z76.0 MEDICATION REFILL: Primary | ICD-10-CM

## 2025-07-14 RX ORDER — SIMVASTATIN 20 MG
20 TABLET ORAL NIGHTLY
Qty: 90 TABLET | Refills: 0 | Status: SHIPPED | OUTPATIENT
Start: 2025-07-14 | End: 2025-10-12

## 2025-07-14 RX ORDER — CETIRIZINE HYDROCHLORIDE 10 MG/1
10 TABLET ORAL DAILY
Qty: 90 TABLET | Refills: 0 | Status: SHIPPED | OUTPATIENT
Start: 2025-07-14

## 2025-07-14 RX ORDER — LOSARTAN POTASSIUM 50 MG/1
50 TABLET ORAL DAILY
Qty: 90 TABLET | Refills: 0 | Status: SHIPPED | OUTPATIENT
Start: 2025-07-14 | End: 2025-10-12

## 2025-07-15 ENCOUNTER — OFFICE VISIT (OUTPATIENT)
Age: 70
End: 2025-07-15

## 2025-07-15 DIAGNOSIS — Z01.89 ROUTINE LAB DRAW: Primary | ICD-10-CM

## 2025-07-15 LAB
ALBUMIN SERPL BCG-MCNC: 4.1 G/DL (ref 3.4–4.9)
ALP SERPL-CCNC: 71 U/L (ref 40–129)
ALT SERPL W/O P-5'-P-CCNC: 15 U/L (ref 10–50)
ANION GAP SERPL CALC-SCNC: 14 MEQ/L (ref 8–16)
AST SERPL-CCNC: 24 U/L (ref 10–50)
BASOPHILS ABSOLUTE: 0 THOU/MM3 (ref 0–0.1)
BASOPHILS NFR BLD AUTO: 0.6 %
BILIRUB SERPL-MCNC: 0.5 MG/DL (ref 0.3–1.2)
BUN SERPL-MCNC: 28 MG/DL (ref 8–23)
CALCIUM SERPL-MCNC: 9.9 MG/DL (ref 8.8–10.2)
CHLORIDE SERPL-SCNC: 101 MEQ/L (ref 98–111)
CHOLEST SERPL-MCNC: 137 MG/DL (ref 100–199)
CO2 SERPL-SCNC: 21 MEQ/L (ref 22–29)
CREAT SERPL-MCNC: 1.2 MG/DL (ref 0.7–1.2)
CRP SERPL-MCNC: < 0.3 MG/DL (ref 0–0.5)
DEPRECATED RDW RBC AUTO: 44 FL (ref 35–45)
EOSINOPHIL NFR BLD AUTO: 3.1 %
EOSINOPHILS ABSOLUTE: 0.2 THOU/MM3 (ref 0–0.4)
ERYTHROCYTE [DISTWIDTH] IN BLOOD BY AUTOMATED COUNT: 12.4 % (ref 11.5–14.5)
FOLATE SERPL-MCNC: > 20 NG/ML (ref 4.6–34.8)
GFR SERPL CREATININE-BSD FRML MDRD: 66 ML/MIN/1.73M2
GLUCOSE SERPL-MCNC: 148 MG/DL (ref 74–109)
HCT VFR BLD AUTO: 47.4 % (ref 42–52)
HDLC SERPL-MCNC: 50 MG/DL
HGB BLD-MCNC: 15.5 GM/DL (ref 14–18)
IMM GRANULOCYTES # BLD AUTO: 0.02 THOU/MM3 (ref 0–0.07)
IMM GRANULOCYTES NFR BLD AUTO: 0.3 %
LDLC SERPL CALC-MCNC: 67 MG/DL
LYMPHOCYTES ABSOLUTE: 2.9 THOU/MM3 (ref 1–4.8)
LYMPHOCYTES NFR BLD AUTO: 40.8 %
MCH RBC QN AUTO: 31.5 PG (ref 26–33)
MCHC RBC AUTO-ENTMCNC: 32.7 GM/DL (ref 32.2–35.5)
MCV RBC AUTO: 96.3 FL (ref 80–94)
MONOCYTES ABSOLUTE: 0.4 THOU/MM3 (ref 0.4–1.3)
MONOCYTES NFR BLD AUTO: 5.6 %
NEUTROPHILS ABSOLUTE: 3.5 THOU/MM3 (ref 1.8–7.7)
NEUTROPHILS NFR BLD AUTO: 49.6 %
NRBC BLD AUTO-RTO: 0 /100 WBC
PLATELET # BLD AUTO: 195 THOU/MM3 (ref 130–400)
PMV BLD AUTO: 10.9 FL (ref 9.4–12.4)
POTASSIUM SERPL-SCNC: 4.7 MEQ/L (ref 3.5–5.2)
PROT SERPL-MCNC: 7.1 G/DL (ref 6.4–8.3)
RBC # BLD AUTO: 4.92 MILL/MM3 (ref 4.7–6.1)
SODIUM SERPL-SCNC: 136 MEQ/L (ref 135–145)
TRIGL SERPL-MCNC: 101 MG/DL (ref 0–199)
TSH SERPL DL<=0.05 MIU/L-ACNC: 1.79 UIU/ML (ref 0.27–4.2)
VIT B12 SERPL-MCNC: 296 PG/ML (ref 232–1245)
WBC # BLD AUTO: 7.1 THOU/MM3 (ref 4.8–10.8)

## 2025-07-15 RX ORDER — CLOPIDOGREL BISULFATE 75 MG/1
75 TABLET ORAL DAILY
COMMUNITY

## 2025-07-16 LAB
DEPRECATED MEAN GLUCOSE BLD GHB EST-ACNC: 171 MG/DL (ref 70–126)
ERYTHROCYTE [SEDIMENTATION RATE] IN BLOOD BY WESTERGREN METHOD: 4 MM/HR (ref 0–20)
HBA1C MFR BLD HPLC: 7.7 % (ref 4–6)

## 2025-08-05 ENCOUNTER — OFFICE VISIT (OUTPATIENT)
Dept: FAMILY MEDICINE CLINIC | Age: 70
End: 2025-08-05
Payer: COMMERCIAL

## 2025-08-05 VITALS
BODY MASS INDEX: 28.44 KG/M2 | SYSTOLIC BLOOD PRESSURE: 138 MMHG | RESPIRATION RATE: 12 BRPM | HEART RATE: 76 BPM | WEIGHT: 181.2 LBS | HEIGHT: 67 IN | OXYGEN SATURATION: 97 % | DIASTOLIC BLOOD PRESSURE: 74 MMHG

## 2025-08-05 DIAGNOSIS — E78.5 HYPERLIPIDEMIA, UNSPECIFIED HYPERLIPIDEMIA TYPE: ICD-10-CM

## 2025-08-05 DIAGNOSIS — E11.9 TYPE 2 DIABETES MELLITUS WITHOUT COMPLICATION, WITHOUT LONG-TERM CURRENT USE OF INSULIN (HCC): ICD-10-CM

## 2025-08-05 DIAGNOSIS — Z00.00 MEDICARE ANNUAL WELLNESS VISIT, SUBSEQUENT: Primary | ICD-10-CM

## 2025-08-05 DIAGNOSIS — I10 HYPERTENSION, UNSPECIFIED TYPE: ICD-10-CM

## 2025-08-05 LAB
CREAT UR-MCNC: 87.5 MG/DL
MICROALBUMIN UR-MCNC: < 2 MG/DL
MICROALBUMIN/CREAT RATIO PNL UR: 23 MG/G (ref 0–30)

## 2025-08-05 PROCEDURE — 1160F RVW MEDS BY RX/DR IN RCRD: CPT | Performed by: NURSE PRACTITIONER

## 2025-08-05 PROCEDURE — 3075F SYST BP GE 130 - 139MM HG: CPT | Performed by: NURSE PRACTITIONER

## 2025-08-05 PROCEDURE — 3017F COLORECTAL CA SCREEN DOC REV: CPT | Performed by: NURSE PRACTITIONER

## 2025-08-05 PROCEDURE — 3051F HG A1C>EQUAL 7.0%<8.0%: CPT | Performed by: NURSE PRACTITIONER

## 2025-08-05 PROCEDURE — 1123F ACP DISCUSS/DSCN MKR DOCD: CPT | Performed by: NURSE PRACTITIONER

## 2025-08-05 PROCEDURE — 1159F MED LIST DOCD IN RCRD: CPT | Performed by: NURSE PRACTITIONER

## 2025-08-05 PROCEDURE — 3078F DIAST BP <80 MM HG: CPT | Performed by: NURSE PRACTITIONER

## 2025-08-05 PROCEDURE — G0439 PPPS, SUBSEQ VISIT: HCPCS | Performed by: NURSE PRACTITIONER

## 2025-08-05 SDOH — ECONOMIC STABILITY: FOOD INSECURITY: WITHIN THE PAST 12 MONTHS, YOU WORRIED THAT YOUR FOOD WOULD RUN OUT BEFORE YOU GOT MONEY TO BUY MORE.: NEVER TRUE

## 2025-08-05 SDOH — ECONOMIC STABILITY: FOOD INSECURITY: WITHIN THE PAST 12 MONTHS, THE FOOD YOU BOUGHT JUST DIDN'T LAST AND YOU DIDN'T HAVE MONEY TO GET MORE.: NEVER TRUE

## 2025-08-05 ASSESSMENT — LIFESTYLE VARIABLES
HOW MANY STANDARD DRINKS CONTAINING ALCOHOL DO YOU HAVE ON A TYPICAL DAY: PATIENT DOES NOT DRINK
HOW OFTEN DO YOU HAVE A DRINK CONTAINING ALCOHOL: NEVER

## 2025-08-05 ASSESSMENT — PATIENT HEALTH QUESTIONNAIRE - PHQ9
1. LITTLE INTEREST OR PLEASURE IN DOING THINGS: NOT AT ALL
SUM OF ALL RESPONSES TO PHQ QUESTIONS 1-9: 0
2. FEELING DOWN, DEPRESSED OR HOPELESS: NOT AT ALL